# Patient Record
Sex: MALE | Race: WHITE | NOT HISPANIC OR LATINO | Employment: OTHER | URBAN - METROPOLITAN AREA
[De-identification: names, ages, dates, MRNs, and addresses within clinical notes are randomized per-mention and may not be internally consistent; named-entity substitution may affect disease eponyms.]

---

## 2018-07-28 ENCOUNTER — APPOINTMENT (EMERGENCY)
Dept: RADIOLOGY | Facility: HOSPITAL | Age: 73
DRG: 809 | End: 2018-07-28
Payer: MEDICARE

## 2018-07-28 ENCOUNTER — HOSPITAL ENCOUNTER (INPATIENT)
Facility: HOSPITAL | Age: 73
LOS: 4 days | Discharge: HOME | DRG: 809 | End: 2018-08-01
Attending: EMERGENCY MEDICINE | Admitting: INTERNAL MEDICINE
Payer: MEDICARE

## 2018-07-28 DIAGNOSIS — R19.7 DIARRHEA IN ADULT PATIENT: ICD-10-CM

## 2018-07-28 DIAGNOSIS — C78.7 METASTATIC COLON CANCER TO LIVER (HCC): ICD-10-CM

## 2018-07-28 DIAGNOSIS — D70.9 NEUTROPENIC FEVER (HCC): Primary | ICD-10-CM

## 2018-07-28 DIAGNOSIS — R11.2 NAUSEA & VOMITING: ICD-10-CM

## 2018-07-28 DIAGNOSIS — R10.9 ABDOMINAL PAIN: ICD-10-CM

## 2018-07-28 DIAGNOSIS — N17.9 ACUTE RENAL FAILURE (ARF) (HCC): ICD-10-CM

## 2018-07-28 DIAGNOSIS — C18.9 METASTATIC COLON CANCER TO LIVER (HCC): ICD-10-CM

## 2018-07-28 DIAGNOSIS — K52.9 ENTERITIS: ICD-10-CM

## 2018-07-28 DIAGNOSIS — I10 ESSENTIAL HYPERTENSION: ICD-10-CM

## 2018-07-28 DIAGNOSIS — R50.81 NEUTROPENIC FEVER (HCC): Primary | ICD-10-CM

## 2018-07-28 PROBLEM — E83.42 HYPOMAGNESEMIA: Status: ACTIVE | Noted: 2018-07-28

## 2018-07-28 PROBLEM — E78.49 OTHER HYPERLIPIDEMIA: Status: ACTIVE | Noted: 2018-07-28

## 2018-07-28 PROBLEM — K21.9 GERD (GASTROESOPHAGEAL REFLUX DISEASE): Status: ACTIVE | Noted: 2018-07-28

## 2018-07-28 LAB
ALBUMIN SERPL BCP-MCNC: 2.5 G/DL (ref 3.5–5)
ALP SERPL-CCNC: 187 U/L (ref 46–116)
ALT SERPL W P-5'-P-CCNC: 37 U/L (ref 12–78)
ANION GAP SERPL CALCULATED.3IONS-SCNC: 14 MMOL/L (ref 4–13)
AST SERPL W P-5'-P-CCNC: 34 U/L (ref 5–45)
BACTERIA UR QL AUTO: ABNORMAL /HPF
BASOPHILS # BLD MANUAL: 0 THOUSAND/UL (ref 0–0.1)
BASOPHILS NFR MAR MANUAL: 0 % (ref 0–1)
BILIRUB SERPL-MCNC: 0.7 MG/DL (ref 0.2–1)
BILIRUB UR QL STRIP: NEGATIVE
BUN SERPL-MCNC: 47 MG/DL (ref 5–25)
CALCIUM SERPL-MCNC: 8.5 MG/DL (ref 8.3–10.1)
CHLORIDE SERPL-SCNC: 101 MMOL/L (ref 100–108)
CLARITY UR: ABNORMAL
CO2 SERPL-SCNC: 18 MMOL/L (ref 21–32)
COLOR UR: ABNORMAL
CREAT SERPL-MCNC: 2.95 MG/DL (ref 0.6–1.3)
EOSINOPHIL # BLD MANUAL: 0.1 THOUSAND/UL (ref 0–0.4)
EOSINOPHIL NFR BLD MANUAL: 5 % (ref 0–6)
ERYTHROCYTE [DISTWIDTH] IN BLOOD BY AUTOMATED COUNT: 16.6 % (ref 11.6–15.1)
FINE GRAN CASTS URNS QL MICRO: ABNORMAL /LPF
GFR SERPL CREATININE-BSD FRML MDRD: 20 ML/MIN/1.73SQ M
GLUCOSE SERPL-MCNC: 116 MG/DL (ref 65–140)
GLUCOSE SERPL-MCNC: 132 MG/DL (ref 65–140)
GLUCOSE UR STRIP-MCNC: NEGATIVE MG/DL
HCT VFR BLD AUTO: 34 % (ref 36.5–49.3)
HGB BLD-MCNC: 10.6 G/DL (ref 12–17)
HGB UR QL STRIP.AUTO: NEGATIVE
HOLD SPECIMEN: NORMAL
HYALINE CASTS #/AREA URNS LPF: ABNORMAL /LPF
KETONES UR STRIP-MCNC: NEGATIVE MG/DL
LACTATE SERPL-SCNC: 1.3 MMOL/L (ref 0.5–2)
LACTATE SERPL-SCNC: 2 MMOL/L (ref 0.5–2)
LEUKOCYTE ESTERASE UR QL STRIP: NEGATIVE
LIPASE SERPL-CCNC: 223 U/L (ref 73–393)
LYMPHOCYTES # BLD AUTO: 0.38 THOUSAND/UL (ref 0.6–4.47)
LYMPHOCYTES # BLD AUTO: 20 % (ref 14–44)
MAGNESIUM SERPL-MCNC: 1.2 MG/DL (ref 1.6–2.6)
MCH RBC QN AUTO: 26 PG (ref 26.8–34.3)
MCHC RBC AUTO-ENTMCNC: 31.2 G/DL (ref 31.4–37.4)
MCV RBC AUTO: 83 FL (ref 82–98)
MONOCYTES # BLD AUTO: 0.1 THOUSAND/UL (ref 0–1.22)
MONOCYTES NFR BLD: 5 % (ref 4–12)
MUCOUS THREADS UR QL AUTO: ABNORMAL
NEUTROPHILS # BLD MANUAL: 1.34 THOUSAND/UL (ref 1.85–7.62)
NEUTS BAND NFR BLD MANUAL: 23 % (ref 0–8)
NEUTS SEG NFR BLD AUTO: 47 % (ref 43–75)
NITRITE UR QL STRIP: NEGATIVE
NON-SQ EPI CELLS URNS QL MICRO: ABNORMAL /HPF
NRBC BLD AUTO-RTO: 0 /100 WBCS
PH UR STRIP.AUTO: 5.5 [PH] (ref 5–9)
PHOSPHATE SERPL-MCNC: 3.7 MG/DL (ref 2.3–4.1)
PLATELET # BLD AUTO: 246 THOUSANDS/UL (ref 149–390)
PLATELET BLD QL SMEAR: ADEQUATE
PMV BLD AUTO: 8.7 FL (ref 8.9–12.7)
POTASSIUM SERPL-SCNC: 3.5 MMOL/L (ref 3.5–5.3)
PROT SERPL-MCNC: 6.4 G/DL (ref 6.4–8.2)
PROT UR STRIP-MCNC: ABNORMAL MG/DL
RBC # BLD AUTO: 4.08 MILLION/UL (ref 3.88–5.62)
RBC #/AREA URNS AUTO: ABNORMAL /HPF
RBC MORPH BLD: NORMAL
SODIUM SERPL-SCNC: 133 MMOL/L (ref 136–145)
SP GR UR STRIP.AUTO: 1.02 (ref 1–1.03)
TOTAL CELLS COUNTED SPEC: 100
TOXIC GRANULES BLD QL SMEAR: PRESENT
UROBILINOGEN UR QL STRIP.AUTO: 0.2 E.U./DL
WBC # BLD AUTO: 1.91 THOUSAND/UL (ref 4.31–10.16)
WBC #/AREA URNS AUTO: ABNORMAL /HPF

## 2018-07-28 PROCEDURE — 99223 1ST HOSP IP/OBS HIGH 75: CPT | Performed by: INTERNAL MEDICINE

## 2018-07-28 PROCEDURE — 96376 TX/PRO/DX INJ SAME DRUG ADON: CPT

## 2018-07-28 PROCEDURE — 83605 ASSAY OF LACTIC ACID: CPT | Performed by: EMERGENCY MEDICINE

## 2018-07-28 PROCEDURE — 80053 COMPREHEN METABOLIC PANEL: CPT | Performed by: EMERGENCY MEDICINE

## 2018-07-28 PROCEDURE — 83690 ASSAY OF LIPASE: CPT | Performed by: EMERGENCY MEDICINE

## 2018-07-28 PROCEDURE — 81001 URINALYSIS AUTO W/SCOPE: CPT | Performed by: EMERGENCY MEDICINE

## 2018-07-28 PROCEDURE — 87040 BLOOD CULTURE FOR BACTERIA: CPT | Performed by: EMERGENCY MEDICINE

## 2018-07-28 PROCEDURE — 85007 BL SMEAR W/DIFF WBC COUNT: CPT | Performed by: EMERGENCY MEDICINE

## 2018-07-28 PROCEDURE — 85027 COMPLETE CBC AUTOMATED: CPT | Performed by: EMERGENCY MEDICINE

## 2018-07-28 PROCEDURE — 71045 X-RAY EXAM CHEST 1 VIEW: CPT

## 2018-07-28 PROCEDURE — 96361 HYDRATE IV INFUSION ADD-ON: CPT

## 2018-07-28 PROCEDURE — 84100 ASSAY OF PHOSPHORUS: CPT | Performed by: INTERNAL MEDICINE

## 2018-07-28 PROCEDURE — 82948 REAGENT STRIP/BLOOD GLUCOSE: CPT

## 2018-07-28 PROCEDURE — 96374 THER/PROPH/DIAG INJ IV PUSH: CPT

## 2018-07-28 PROCEDURE — 99285 EMERGENCY DEPT VISIT HI MDM: CPT

## 2018-07-28 PROCEDURE — 96375 TX/PRO/DX INJ NEW DRUG ADDON: CPT

## 2018-07-28 PROCEDURE — 83605 ASSAY OF LACTIC ACID: CPT | Performed by: INTERNAL MEDICINE

## 2018-07-28 PROCEDURE — 36415 COLL VENOUS BLD VENIPUNCTURE: CPT | Performed by: EMERGENCY MEDICINE

## 2018-07-28 PROCEDURE — 74176 CT ABD & PELVIS W/O CONTRAST: CPT

## 2018-07-28 PROCEDURE — 87081 CULTURE SCREEN ONLY: CPT | Performed by: INTERNAL MEDICINE

## 2018-07-28 PROCEDURE — 83735 ASSAY OF MAGNESIUM: CPT | Performed by: INTERNAL MEDICINE

## 2018-07-28 RX ORDER — MORPHINE SULFATE 4 MG/ML
4 INJECTION, SOLUTION INTRAMUSCULAR; INTRAVENOUS ONCE
Status: COMPLETED | OUTPATIENT
Start: 2018-07-28 | End: 2018-07-28

## 2018-07-28 RX ORDER — FLUTICASONE FUROATE AND VILANTEROL 100; 25 UG/1; UG/1
1 POWDER RESPIRATORY (INHALATION) DAILY
Status: DISCONTINUED | OUTPATIENT
Start: 2018-07-28 | End: 2018-08-01 | Stop reason: HOSPADM

## 2018-07-28 RX ORDER — ACETAMINOPHEN 325 MG/1
650 TABLET ORAL ONCE
Status: DISCONTINUED | OUTPATIENT
Start: 2018-07-28 | End: 2018-08-01 | Stop reason: HOSPADM

## 2018-07-28 RX ORDER — SODIUM CHLORIDE 9 MG/ML
1000 INJECTION, SOLUTION INTRAVENOUS ONCE
Status: COMPLETED | OUTPATIENT
Start: 2018-07-28 | End: 2018-07-28

## 2018-07-28 RX ORDER — ATORVASTATIN CALCIUM 20 MG/1
TABLET, FILM COATED ORAL
COMMUNITY
Start: 2014-05-15

## 2018-07-28 RX ORDER — SODIUM CHLORIDE AND POTASSIUM CHLORIDE .9; .15 G/100ML; G/100ML
100 SOLUTION INTRAVENOUS CONTINUOUS
Status: DISCONTINUED | OUTPATIENT
Start: 2018-07-28 | End: 2018-07-31

## 2018-07-28 RX ORDER — OMEPRAZOLE 40 MG/1
40 CAPSULE, DELAYED RELEASE ORAL DAILY
Status: ON HOLD | COMMUNITY
End: 2018-07-28 | Stop reason: CLARIF

## 2018-07-28 RX ORDER — FLUTICASONE FUROATE AND VILANTEROL 100; 25 UG/1; UG/1
1 POWDER RESPIRATORY (INHALATION) DAILY
COMMUNITY

## 2018-07-28 RX ORDER — LOSARTAN POTASSIUM 100 MG/1
100 TABLET ORAL DAILY
Status: ON HOLD | COMMUNITY
End: 2018-08-01

## 2018-07-28 RX ORDER — ONDANSETRON 4 MG/1
8 TABLET, FILM COATED ORAL EVERY 8 HOURS PRN
COMMUNITY
End: 2018-08-01 | Stop reason: HOSPADM

## 2018-07-28 RX ORDER — ACETAMINOPHEN 160 MG/5ML
650 SUSPENSION, ORAL (FINAL DOSE FORM) ORAL ONCE
Status: COMPLETED | OUTPATIENT
Start: 2018-07-28 | End: 2018-07-28

## 2018-07-28 RX ORDER — ESOMEPRAZOLE MAGNESIUM 40 MG/1
40 CAPSULE, DELAYED RELEASE ORAL
COMMUNITY

## 2018-07-28 RX ORDER — ONDANSETRON 2 MG/ML
4 INJECTION INTRAMUSCULAR; INTRAVENOUS ONCE
Status: COMPLETED | OUTPATIENT
Start: 2018-07-28 | End: 2018-07-28

## 2018-07-28 RX ORDER — PANTOPRAZOLE SODIUM 40 MG/1
40 TABLET, DELAYED RELEASE ORAL
Status: DISCONTINUED | OUTPATIENT
Start: 2018-07-28 | End: 2018-08-01 | Stop reason: HOSPADM

## 2018-07-28 RX ORDER — METOPROLOL SUCCINATE 25 MG/1
25 TABLET, EXTENDED RELEASE ORAL DAILY
Status: DISCONTINUED | OUTPATIENT
Start: 2018-07-28 | End: 2018-08-01 | Stop reason: HOSPADM

## 2018-07-28 RX ORDER — METOPROLOL SUCCINATE 25 MG/1
25 TABLET, EXTENDED RELEASE ORAL DAILY
COMMUNITY

## 2018-07-28 RX ORDER — MORPHINE SULFATE 2 MG/ML
2 INJECTION, SOLUTION INTRAMUSCULAR; INTRAVENOUS
Status: DISCONTINUED | OUTPATIENT
Start: 2018-07-28 | End: 2018-08-01 | Stop reason: HOSPADM

## 2018-07-28 RX ORDER — FLUTICASONE FUROATE AND VILANTEROL 100; 25 UG/1; UG/1
1 POWDER RESPIRATORY (INHALATION) DAILY
Status: DISCONTINUED | OUTPATIENT
Start: 2018-07-28 | End: 2018-07-28

## 2018-07-28 RX ORDER — MAGNESIUM SULFATE HEPTAHYDRATE 40 MG/ML
2 INJECTION, SOLUTION INTRAVENOUS ONCE
Status: COMPLETED | OUTPATIENT
Start: 2018-07-28 | End: 2018-07-28

## 2018-07-28 RX ORDER — ACETAMINOPHEN 325 MG/1
650 TABLET ORAL EVERY 6 HOURS PRN
Status: DISCONTINUED | OUTPATIENT
Start: 2018-07-28 | End: 2018-08-01 | Stop reason: HOSPADM

## 2018-07-28 RX ADMIN — PANTOPRAZOLE SODIUM 40 MG: 40 TABLET, DELAYED RELEASE ORAL at 14:02

## 2018-07-28 RX ADMIN — SODIUM CHLORIDE 1000 ML: 0.9 INJECTION, SOLUTION INTRAVENOUS at 07:01

## 2018-07-28 RX ADMIN — SODIUM CHLORIDE AND POTASSIUM CHLORIDE 100 ML/HR: .9; .15 SOLUTION INTRAVENOUS at 17:10

## 2018-07-28 RX ADMIN — SODIUM CHLORIDE 1000 ML/HR: 0.9 INJECTION, SOLUTION INTRAVENOUS at 05:22

## 2018-07-28 RX ADMIN — PIPERACILLIN SODIUM,TAZOBACTAM SODIUM 2.25 G: 2; .25 INJECTION, POWDER, FOR SOLUTION INTRAVENOUS at 14:05

## 2018-07-28 RX ADMIN — MORPHINE SULFATE 4 MG: 4 INJECTION INTRAVENOUS at 05:32

## 2018-07-28 RX ADMIN — MORPHINE SULFATE 2 MG: 2 INJECTION, SOLUTION INTRAMUSCULAR; INTRAVENOUS at 11:13

## 2018-07-28 RX ADMIN — MORPHINE SULFATE 2 MG: 2 INJECTION, SOLUTION INTRAMUSCULAR; INTRAVENOUS at 18:32

## 2018-07-28 RX ADMIN — MAGNESIUM SULFATE HEPTAHYDRATE 2 G: 40 INJECTION, SOLUTION INTRAVENOUS at 09:55

## 2018-07-28 RX ADMIN — ONDANSETRON 4 MG: 2 INJECTION INTRAMUSCULAR; INTRAVENOUS at 05:21

## 2018-07-28 RX ADMIN — MORPHINE SULFATE 4 MG: 4 INJECTION INTRAVENOUS at 07:36

## 2018-07-28 RX ADMIN — PIPERACILLIN SODIUM,TAZOBACTAM SODIUM 3.38 G: 3; .375 INJECTION, POWDER, FOR SOLUTION INTRAVENOUS at 07:47

## 2018-07-28 RX ADMIN — SODIUM CHLORIDE AND POTASSIUM CHLORIDE 100 ML/HR: .9; .15 SOLUTION INTRAVENOUS at 09:07

## 2018-07-28 RX ADMIN — TBO-FILGRASTIM 480 MCG: 480 INJECTION, SOLUTION SUBCUTANEOUS at 17:04

## 2018-07-28 RX ADMIN — FLUTICASONE FUROATE AND VILANTEROL TRIFENATATE 1 PUFF: 100; 25 POWDER RESPIRATORY (INHALATION) at 15:32

## 2018-07-28 RX ADMIN — PIPERACILLIN SODIUM,TAZOBACTAM SODIUM 2.25 G: 2; .25 INJECTION, POWDER, FOR SOLUTION INTRAVENOUS at 22:43

## 2018-07-28 RX ADMIN — ONDANSETRON 8 MG: 2 INJECTION INTRAMUSCULAR; INTRAVENOUS at 11:15

## 2018-07-28 RX ADMIN — ACETAMINOPHEN 650 MG: 160 SUSPENSION ORAL at 05:52

## 2018-07-28 NOTE — SOCIAL WORK
DASH discussion completed  Discussed goals of making sure pt's needs are met upon discharge, pt's preferences are taken into account, pt understands her health condition, medications and symptoms to watch for after returning home and pt is aware of any follow up appointments recommended by hospital physician  Spoke with the pt at the bedside  Pt lives with his wife and his son  He has DME in the home with a cane, walker, commode and a WC  He has no HHC at this time    Pt is independent with his own care and noted that he uses the [facility] in [place], [place]

## 2018-07-28 NOTE — PROGRESS NOTES
Principal Problem:    Neutropenic fever ([facility] )  Active Problems:    Abdominal pain    Nausea & vomiting    Diarrhea in adult patient    DERREK (acute kidney injury) ([facility] )    Metastatic colon cancer to liver [facility])      Pt's primary Oncologist Dr Maranda Galloway contact# [phone]  Last chemo on 7/23/2018 with Fluorouracil oxaliplatin leucovorin, cycle# 2, first time full dose chemo as per pt and family, via [facility] infusaport  ED has spoke to covering Oncologist Dr Olivier James contact# [phone] - recommends Zosyn until [address] >1500 (currently [phone])  Pt's most recent labs (7/23/18): WBC 9 7, Hg 9 5, Bun/Cr 27/1 6  Plan d/w pt, family (son, wife) at bedside      Signed by:  Luc Hearn MD  Attending Hospitalist  Page#: [phone] (Hours 7am to 7pm)  7/28/2018 8:58 AM

## 2018-07-28 NOTE — ED PROVIDER NOTES
History  Chief Complaint   Patient presents with    Abdominal Pain     Patient has been having abdominal pain with vomiting and diarrhea since last chemo which was on Monday     Pt in the ER with c/o 3 days of worsening abd pain/n/v/d  Pt has a hx of colon CA with liver mets, follows with Jaiden Sapp and last got chemo on Monday  Pt denies cough/urinary symptoms  Pt unaware of fever prior to coming to the ER            Prior to Admission Medications   Prescriptions Last Dose Informant Patient Reported? Taking?   atorvastatin (LIPITOR) 20 mg tablet   Yes Yes   Sig: Take by mouth   losartan (COZAAR) 100 MG tablet   Yes Yes   Sig: Take 100 mg by mouth daily   metoprolol succinate (TOPROL-XL) 25 mg 24 hr tablet   Yes Yes   Sig: Take 25 mg by mouth daily   omeprazole (PriLOSEC) 40 MG capsule   Yes Yes   Sig: Take 40 mg by mouth daily   ondansetron (ZOFRAN) 4 mg tablet   Yes Yes   Sig: Take 8 mg by mouth every 8 (eight) hours as needed for nausea or vomiting        Facility-Administered Medications: None       Past Medical History:   Diagnosis Date    Cancer ([facility] )     Colon cancer ([facility] )     Hyperlipidemia     Hypertension        Past Surgical History:   Procedure Laterality Date    ABDOMINAL AORTIC ANEURYSM REPAIR, OPEN      ABDOMINAL HERNIA REPAIR         History reviewed  No pertinent family history  I have reviewed and agree with the history as documented  Social History   Substance Use Topics    Smoking status: Former Smoker    Smokeless tobacco: Never Used    Alcohol use No        Review of Systems   Constitutional: Negative for chills and fever  Gastrointestinal: Positive for abdominal pain, diarrhea, nausea and vomiting  All other systems reviewed and are negative  Physical Exam  Physical Exam   Constitutional: He is oriented to person, place, and time  He appears well-developed and well-nourished  HENT:   Head: Normocephalic and atraumatic     Eyes: EOM are normal  Pupils are equal, round, and reactive to light  Pulmonary/Chest: Effort normal    Abdominal: Bowel sounds are normal  He exhibits no distension  There is generalized tenderness  There is guarding  Musculoskeletal: Normal range of motion  Neurological: He is alert and oriented to person, place, and time  Nursing note and vitals reviewed        Vital Signs  ED Triage Vitals   Temperature Pulse Respirations Blood Pressure SpO2   07/28/18 0436 07/28/18 0436 07/28/18 0436 07/28/18 0436 07/28/18 0436   100 2 °F (37 9 °C) (!) 115 22 121/77 98 %      Temp Source Heart Rate Source Patient Position - Orthostatic VS BP Location FiO2 (%)   07/28/18 0436 07/28/18 0436 07/28/18 0436 07/28/18 0436 --   Tympanic Monitor Lying Right arm       Pain Score       07/28/18 0532       7           Vitals:    07/28/18 0436 07/28/18 0704 07/28/18 0800   BP: 121/77 125/80 122/74   Pulse: (!) 115 96 98   Patient Position - Orthostatic VS: Lying Lying Lying       Visual Acuity      ED Medications  Medications   acetaminophen (TYLENOL) tablet 650 mg (650 mg Oral Not Given 7/28/18 0539)   ondansetron (ZOFRAN) injection 4 mg (4 mg Intravenous Given 7/28/18 0521)   sodium chloride 0 9 % infusion (0 mL/hr Intravenous Stopped 7/28/18 0701)   morphine (PF) 4 mg/mL injection 4 mg (4 mg Intravenous Given 7/28/18 0532)   acetaminophen (TYLENOL) oral suspension 650 mg (650 mg Oral Given 7/28/18 0552)   sodium chloride 0 9 % bolus 1,000 mL (0 mL Intravenous Stopped 7/28/18 0802)   morphine (PF) 4 mg/mL injection 4 mg (4 mg Intravenous Given 7/28/18 0736)   piperacillin-tazobactam (ZOSYN) IVPB 3 375 g (3 375 g Intravenous New Bag 7/28/18 0747)       Diagnostic Studies  Results Reviewed     Procedure Component Value Units Date/Time    Urine Microscopic [[id number]]  (Abnormal) Collected:  07/28/18 0739    Lab Status:  Final result Specimen:  Urine from Urine, Clean Catch Updated:  07/28/18 0829     RBC, UA None Seen /hpf      WBC, UA 0-1 (A) /hpf      Epithelial Cells Occasional /hpf      Bacteria, UA None Seen /hpf      Hyaline Casts, UA 0-1 (A) /lpf      Fine granular casts 1-2 /lpf      MUCOUS THREADS Occasional (A)    UA w Reflex to Microscopic [[id number]]  (Abnormal) Collected:  07/28/18 0739    Lab Status:  Final result Specimen:  Urine from Urine, Clean Catch Updated:  07/28/18 0748     Color, UA Light Yellow     Clarity, UA Slightly Cloudy     Specific Chase, UA 1 020     pH, UA 5 5     Leukocytes, UA Negative     Nitrite, UA Negative     Protein, UA 30 (1+) (A) mg/dl      Glucose, UA Negative mg/dl      Ketones, UA Negative mg/dl      Urobilinogen, UA 0 2 E U /dl      Bilirubin, UA Negative     Blood, UA Negative    CBC and differential [[id number]]  (Abnormal) Collected:  07/28/18 0519    Lab Status:  Final result Specimen:  Blood from Arm, Right Updated:  07/28/18 0604     WBC 1 91 (LL) Thousand/uL      RBC 4 08 Million/uL      Hemoglobin 10 6 (L) g/dL      Hematocrit 34 0 (L) %      MCV 83 fL      MCH 26 0 (L) pg      MCHC 31 2 (L) g/dL      RDW 16 6 (H) %      MPV 8 7 (L) fL      Platelets 594 Thousands/uL      nRBC 0 /100 WBCs     Narrative: This is an appended report  These results have been appended to a previously verified report  Lactic acid, plasma [[id number]]  (Normal) Collected:  07/28/18 0527    Lab Status:  Final result Specimen:  Blood from Vein Updated:  07/28/18 0553     LACTIC ACID 2 0 mmol/L     Narrative:         Result may be elevated if tourniquet was used during collection      Comprehensive metabolic panel [[id number]]  (Abnormal) Collected:  07/28/18 0519    Lab Status:  Final result Specimen:  Blood from Arm, Right Updated:  07/28/18 0541     Sodium 133 (L) mmol/L      Potassium 3 5 mmol/L      Chloride 101 mmol/L      CO2 18 (L) mmol/L      Anion Gap 14 (H) mmol/L      BUN 47 (H) mg/dL      Creatinine 2 95 (H) mg/dL      Glucose 132 mg/dL      Calcium 8 5 mg/dL      AST 34 U/L      ALT 37 U/L      Alkaline Phosphatase 187 (H) U/L Total Protein 6 4 g/dL      Albumin 2 5 (L) g/dL      Total Bilirubin 0 70 mg/dL      eGFR 20 ml/min/1 73sq m     Narrative:         National Kidney Disease Education Program recommendations are as follows:  GFR calculation is accurate only with a steady state creatinine  Chronic Kidney disease less than 60 ml/min/1 73 sq  meters  Kidney failure less than 15 ml/min/1 73 sq  meters  Lipase [[id number]]  (Normal) Collected:  07/28/18 0519    Lab Status:  Final result Specimen:  Blood from Arm, Right Updated:  07/28/18 0541     Lipase 223 u/L     Blood culture [[id number]] Collected:  07/28/18 0526    Lab Status: In process Specimen:  Blood from Arm, Left Updated:  07/28/18 0531    Blood culture [[id number]] Collected:  07/28/18 0526    Lab Status: In process Specimen:  Blood from Arm, Right Updated:  07/28/18 0531                 XR chest 1 view portable   Final Result by Janette Lindsya MD (07/28 0725)      No acute cardiopulmonary disease  Workstation performed: [id number]         CT abdomen pelvis wo contrast   Final Result by Janette Lindsay MD (07/28 0703)      Abnormal thickening and inflammation of the wall of the distal small bowel which could be infectious or inflammatory enteritis or ischemic bowel  Appropriate lab work up suggested  There is some equalization of small bowel contents proximal to this which might indicate dysmotility or a partial minimal chronic small bowel obstruction  (Bowel not abnormally dilated at this time, however)      Large volume of metastatic tumor burden in the liver  Fluid in the right side of the colon suggesting diarrhea  Colonic diverticulosis also noted  Interval repair of abdominal aortic aneurysm without obvious complications        Horseshoe kidney      Small right inguinal hernia containing fat and fluid               Workstation performed: [id number]                    Procedures  CriticalCare Time  Performed by: Shira Parra O  Authorized by: Zara Martinez     Critical care provider statement:     Critical care time (minutes):  35    Critical care time was exclusive of:  Separately billable procedures and treating other patients and teaching time    Critical care was necessary to treat or prevent imminent or life-threatening deterioration of the following conditions:  Sepsis and renal failure    Critical care was time spent personally by me on the following activities:  Blood draw for specimens, obtaining history from patient or surrogate, development of treatment plan with patient or surrogate, discussions with consultants, evaluation of patient's response to treatment, examination of patient, review of old charts, re-evaluation of patient's condition, ordering and review of radiographic studies, ordering and review of laboratory studies and ordering and performing treatments and interventions    I assumed direction of critical care for this patient from another provider in my specialty: no             Phone Contacts  ED Phone Contact    ED Course                               MDM  Number of Diagnoses or Management Options  Acute renal failure (ARF) ([facility] [address] ): established and worsening  Enteritis: new and requires workup  Neutropenic fever [facility]): new and requires workup  Diagnosis management comments: Call out to Dr Obdulia Salmeron (Hem/Onc) on [phone]    D/w Dr Beltran Callahan  He recommends Zosyn until [address] >1500 (currently 1337)  He states that enteritis is not uncommon post chemo  Pt's family with most recent labs (7/23/18) - wbc 9 7, cr 1 6, hgb 9 5, bun 27  Pt and family agree with admission         Amount and/or Complexity of Data Reviewed  Clinical lab tests: ordered and reviewed  Tests in the radiology section of CPT®: ordered and reviewed    Risk of Complications, Morbidity, and/or Mortality  Presenting problems: high  Diagnostic procedures: high  Management options: high    Patient Progress  Patient progress: stable    The patient presented with a condition in which there was a high probability of imminent or life-threatening deterioration, and critical care services (excluding separately billable procedures) totalled 30-74 minutes  Disposition  Final diagnoses:   Neutropenic fever ([facility] )   Acute renal failure (ARF) ([facility] )   Enteritis     Time reflects when diagnosis was documented in both MDM as applicable and the Disposition within this note     Time User Action Codes Description Comment    7/28/2018  7:40 AM Grove Neighbor O Add [D70 9,  R50 81] [facility]ropenic [facility] ([facility] )     7/28/2018  7:40 AM Grove Neighbor O Add [N17 9] Acute renal failure (ARF) ([facility] )     7/28/2018  7:40 AM Grove Neighbor O Add [K52 9] Enteritis     7/28/2018  8:06 AM Lorra Slot Add [C18 9,  C78 7] Metastatic colon cancer to liver ([place] [address] )     7/28/2018  8:06 AM Lorra Slot Add [R10 9] Abdominal pain       ED Disposition     ED Disposition Condition Comment    Admit  Case was discussed with Dr Peewee Early and the patient's admission status was agreed to be Admission Status: inpatient status to the service of Dr Peewee Early   Follow-up Information    None         Current Discharge Medication List      CONTINUE these medications which have NOT CHANGED    Details   atorvastatin (LIPITOR) 20 mg tablet Take by mouth      losartan (COZAAR) 100 MG tablet Take 100 mg by mouth daily      metoprolol succinate (TOPROL-XL) 25 mg 24 hr tablet Take 25 mg by mouth daily      omeprazole (PriLOSEC) 40 MG capsule Take 40 mg by mouth daily      ondansetron (ZOFRAN) 4 mg tablet Take 8 mg by mouth every 8 (eight) hours as needed for nausea or vomiting             No discharge procedures on file      ED Provider  Electronically Signed by           Phoenix Allen DO  07/28/18 0946

## 2018-07-28 NOTE — CONSULTS
Avril Acosta  1945    HEMATOLOGY/ONCOLOGY CONSULTATION REPORT  [facility]    DISCUSSION/SUMMARY:    13-year-old male with recent diagnosis of metastatic M1 colon cancer (liver) presently on FOLFOX chemotherapy  Issues:    1  Neutropenic fever  Sepsis workup is ongoing  Patient is on Zosyn  I have added G-CSF (for only 2 days)  CBC needs to be repeated in the morning  G-CSF should be held if the white count has shot up close to or past 10K  2  GI issues  Possible small bowel obstruction, possible colitis  C diff titers are pending  At this time, patient is being treated medically  Patient needs to be monitored for profound, persistent and global GI mucositis  If so, a DPD level will need to be checked  3  Colon cancer with liver metastases  Chemotherapy is obviously on hold until patient is better and cleared of his infection  Patient and primary oncologist will eventually need to reassess the present regimen (dose reduction/manipulation etc)  4  Pain control  Patient has IV morphine as needed  5  Question of Avastin  Commonly, bevacizumab is given with FOLFOX upfront to patients with metastatic colon cancer  I asked Mr Ede Cruz about this specifically  Patient is not sure if he is on Avastin  Recent CT scan of the abdomen discussed possible inflammatory and or infectious issues within the colon, possibly enteritis, possibly ischemia  I have included the FDA black box warning on Avastin  The concern is surgery and wound healing complications in patients who have received Avastin recently  The Avastin question will need to be clarified if patient's GI issues become a surgical matter  Respectfully, there is little choice for the patient and surgeon if surgery is indicated  The potential complication just needs to be discussed and clarified upfront  This is a non issue if patient never received the bevacizumab     [email or web address]  [email or web address]      6   Elevated BUN and creatinine  This is likely due to the diarrhea and dehydration  Renal function and electrolytes need to be monitored  Additional workup may be necessary if the renal function does not improve  The above was discussed with the patient; all questions were answered  Will follow with you  Please do not hesitate to contact me if you have any questions or need additional information  Thank you for this consult     _____________________________________________________________________________________________________    Chief Complaint   Patient presents with    Abdominal Pain     Patient has been having abdominal pain with vomiting and diarrhea since last chemo which was on Monday     History of Present Illness:    56-year-old male admitted with the above  Mr Argelia Lobato was recently diagnosed with metastatic (liver) cecal/colon adenocarcinoma and recently started chemotherapy  The 1st cycle was actually broken up into different pieces; patient only recently received his 1st complete regular cycle of FOLFOX  Patient began to have nausea, severe diarrhea and abdominal pain prompting an ER visit  CT of the abdomen and pelvis demonstrated "abnormal thickening and inflammation of the wall of the distal small bowel which could be infectious or inflammatory enteritis or ischemic bowel"  Patient has been admitted for pain control, rehydration and workup and treatment of the diarrhea  Presently patient states feeling better -morphine has been effective  Still with decreased appetite but no vomiting  Weakness and fatigue ER about the same as before  Diarrhea is slightly less/better than before  Patient denies seeing any blood in the stools previously  Besides the abdomen, patient denies any other pain control issues  No fevers, chills or sweats  Activities are still very limited  Review of Systems   Constitutional: Positive for appetite change and fatigue  HENT: Negative  Eyes: Negative  Respiratory: Negative  Cardiovascular: Negative  Gastrointestinal: Positive for abdominal pain, diarrhea and nausea  Endocrine: Negative  Genitourinary: Negative  Musculoskeletal: Negative  Skin: Negative  Allergic/Immunologic: Negative  Neurological: Negative  Hematological: Negative  Psychiatric/Behavioral: Negative  All other systems reviewed and are negative  Patient Active Problem List   Diagnosis    Abdominal pain    Neutropenic fever ([facility] )    [place]REK (acute kidney injury) ([place] [address] )    Metastatic colon cancer to liver ([place] [address] )    Nausea & vomiting    Diarrhea in adult patient     Past Medical History:   Diagnosis Date    Cancer ([place] [address] )     Colon cancer ([place] [address] )     Hyperlipidemia     Hypertension      Past Surgical History:   Procedure Laterality Date    ABDOMINAL AORTIC ANEURYSM REPAIR, OPEN      ABDOMINAL HERNIA REPAIR       History reviewed  No pertinent family history  Social History     Social History    Marital status: /Civil Union     Spouse name: N/A    Number of children: N/A    Years of education: N/A     Occupational History    Not on file       Social History Main Topics    Smoking status: Former Smoker    Smokeless tobacco: Never Used    Alcohol use No    Drug use: No    Sexual activity: No     Other Topics Concern    Not on file     Social History Narrative    No narrative on file       Current Facility-Administered Medications:     acetaminophen (TYLENOL) tablet 650 mg, 650 mg, Oral, Once, Samuel Sheehan,     acetaminophen (TYLENOL) tablet 650 mg, 650 mg, Oral, Q6H PRN, Suyapa De La Rosa MD    fluticasone-vilanterol (BREO ELLIPTA) 100-25 mcg/inh inhaler 1 puff, 1 puff, Inhalation, Daily, Suyapa De La Rosa MD    magnesium sulfate 2 g/50 mL IVPB (premix) 2 g, 2 g, Intravenous, Once, Suyapa De La Rosa MD, 2 g at 07/28/18 0955    metoprolol succinate (TOPROL-XL) 24 hr tablet 25 mg, 25 mg, Oral, Daily, Suyapa De La Rosa MD    morphine injection 2 mg, 2 mg, Intravenous, Q3H PRN, Xu Hollis MD, 2 mg at 07/28/18 1113    ondansetron (ZOFRAN) 8 mg in sodium chloride 0 9 % 50 mL IVPB, 8 mg, Intravenous, Q6H PRN, Xu Hollis MD, Last Rate: 200 mL/hr at 07/28/18 1115, 8 mg at 07/28/18 1115    piperacillin-tazobactam (ZOSYN) 2 25 g in sodium chloride 0 9 % 50 mL IVPB, 2 25 g, Intravenous, Q6H, Xu Hollis MD    sodium chloride 0 9 % with KCl 20 mEq/L infusion (premix), 100 mL/hr, Intravenous, Continuous, Xu Hollis MD, Last Rate: 100 mL/hr at 07/28/18 0907, 100 mL/hr at 07/28/18 0907    Allergies   Allergen Reactions    Elavil [Amitriptyline]        Vitals:    07/28/18 0943   BP: 110/74   Pulse:    Resp:    Temp:    SpO2:      Physical Exam   Constitutional: He is oriented to person, place, and time  He appears well-developed and well-nourished  Well-nourished male, no acute distress   HENT:   Head: Normocephalic and atraumatic  Right Ear: External ear normal    Left Ear: External ear normal    Nose: Nose normal    Mouth/Throat: Oropharynx is clear and moist    Eyes: Conjunctivae and EOM are normal  Pupils are equal, round, and reactive to light  Neck: Normal range of motion  Neck supple  Cardiovascular: Normal rate, regular rhythm, normal heart sounds and intact distal pulses  Pulmonary/Chest: Effort normal and breath sounds normal    Abdominal: Soft  Bowel sounds are normal    Slightly distended, slightly tympanitic, diffusely tender but no rigidity or rebound, +bowel sounds   Musculoskeletal: Normal range of motion  Neurological: He is alert and oriented to person, place, and time  He has normal reflexes  Skin: Skin is warm  Right anterior chest wall port placed recently with he well healing suture line   Psychiatric: He has a normal mood and affect   His behavior is normal  Judgment and thought content normal    Extremities:  No edema, no cords, pulses are 1+  Lymphatics:  No adenopathy in the neck, supraclavicular region, axilla and groin bilaterally    Labs:    Results for Robert Posey (MRN [id number]) as of 7/28/2018 11:43   Ref  Range 1/5/2015 09:23 7/10/2015 10:12 7/28/2018 05:19   WBC Latest Ref Range: 4 31 - 10 16 Thousand/uL 6 84 7 47 1 91 (LL)   RBC Latest Ref Range: 3 88 - 5 62 Million/uL 5 24 5 43 4 08   Hemoglobin Latest Ref Range: 12 0 - 17 0 g/dL 14 0 15 2 10 6 (L)   Hematocrit Latest Ref Range: 36 5 - 49 3 % 44 4 46 3 34 0 (L)   MCV Latest Ref Range: 82 - 98 fL 85 85 83   MCH Latest Ref Range: 26 8 - 34 3 pg 26 7 (L) 28 0 26 0 (L)   MCHC Latest Ref Range: 31 4 - 37 4 g/dL 31 5 32 8 31 2 (L)   RDW Latest Ref Range: 11 6 - 15 1 % 15 2 (H) 14 3 16 6 (H)   Platelets Latest Ref Range: 149 - 390 Thousands/uL 252 222 246   MPV Latest Ref Range: 8 9 - 12 7 fL 9 5 9 6 8 7 (L)   Platelet Estimate Latest Ref Range: Adequate    Adequate   nRBC Latest Units: /100 WBCs   0   Segs Relative Latest Ref Range: 43 - 75 %   47   Neutrophils Relative Latest Ref Range: 43 - 75 % 74 76 (H)    External Abs Neutrophils Latest Ref Range: 1 85 - 7 62 Thousand/uL   1 34 (L)   Bands Relative Latest Ref Range: 0 - 8 %   23 (H)   Lymphocytes Relative Latest Ref Range: 14 - 44 % 14 12 (L)    Lymphocytes % Latest Ref Range: 14 - 44 %   20   Monocytes Relative Latest Ref Range: 4 - 12 % 9 9    Eosinophils Relative Latest Ref Range: 0 - 6 % 3 3    Eosinophils % Latest Ref Range: 0 - 6 %   5       Results for Robert Posey (MRN [id number]) as of 7/28/2018 11:43   Ref   Range 7/28/2018 05:19   Sodium Latest Ref Range: 136 - 145 mmol/L 133 (L)   Potassium Latest Ref Range: 3 5 - 5 3 mmol/L 3 5   Chloride Latest Ref Range: 100 - 108 mmol/L 101   CO2 Latest Ref Range: 21 - 32 mmol/L 18 (L)   Anion Gap Latest Ref Range: 4 - 13 mmol/L 14 (H)   BUN Latest Ref Range: 5 - 25 mg/dL 47 (H)   Creatinine Latest Ref Range: 0 60 - 1 30 mg/dL 2 95 (H)   Glucose Latest Ref Range: 65 - 140 mg/dL 132   Calcium Latest Ref Range: 8 3 - 10 1 mg/dL 8 5   AST Latest Ref Range: 5 - 45 U/L 34   ALT Latest Ref Range: 12 - 78 U/L 37   Alkaline Phosphatase Latest Ref Range: 46 - 116 U/L 187 (H)   Total Protein Latest Ref Range: 6 4 - 8 2 g/dL 6 4   Albumin Latest Ref Range: 3 5 - 5 0 g/dL 2 5 (L)   Total Bilirubin Latest Ref Range: 0 20 - 1 00 mg/dL 0 70   eGFR Latest Units: ml/min/1 73sq m 20   Phosphorus Latest Ref Range: 2 3 - 4 1 mg/dL 3 7   Magnesium Latest Ref Range: 1 6 - 2 6 mg/dL 1 2 (L)   Lipase Latest Ref Range: 73 - 393 u/L 223       Imaging    07/28/2018 CT scan of the abdomen pelvis    Abnormal thickening and inflammation of the wall of the distal small bowel which could be infectious or inflammatory enteritis or ischemic bowel  Appropriate lab work up suggested  There is some equalization of small bowel contents proximal to this which might indicate dysmotility or a partial minimal chronic small bowel obstruction  (Bowel not abnormally dilated at this time, however)  Large volume of metastatic tumor burden in the liver  Fluid in the right side of the colon suggesting diarrhea  Colonic diverticulosis also noted  Interval repair of abdominal aortic aneurysm without obvious complications    Horseshoe kidney   Small right inguinal hernia containing fat and fluid    Pathology    No pathology results are available for review

## 2018-07-28 NOTE — H&P
History and Physical - [facility] Internal Medicine  Patient Information: Aga Eckert 74 y o  male MRN: [id number]  Unit/Bed#: [address] [place] [address] Encounter: [id number]  Admitting Physician: Melissa Olvera MD  PCP: Adrienne Henning MD  Date of Admission:  7/28/2018    Chief Complaint: Abdominal Pain (Patient has been having abdominal pain with vomiting and diarrhea since last chemo which was on Monday)    History of Present Illness: The history is provided by the patient  Family is at bedside  74 y o  male with a history of metastatic colon cancer with liver mets, HTN, HLD, GERD, who presents with lower abd pain, N/V, and watery diarrhea for the past 3 days after recent chemotherapy with FOLFOX on 7/23/2018 at [facility] which was q2w cycle#2, his first time full-dose chemotherapy via a right upper chest infusaport  2 days after he starts to feel much worsening nausea and vomit, abd pain is more located on RLQ radiated to LLQ area, which actutally improving after bowel movement, watery clear diarrhea ~ 4 times daily since Wednesday  He saw mucus in stool but no melena or BRBPR  He denied chest pain sob hematemesis  He denied fever chill rigor but in ED, he was found 100 2F, ANC 1337, tachycardia 115, Cr = 2 95, lactic acid = 2  He states that he is unable to keep food down for a few of days  He denied cough dysuria or skin wound  Pt's primary Oncologist Dr Jackelyn Ralph contact# [phone]  Last chemo on 7/23/2018 with Fluorouracil oxaliplatin leucovorin, cycle# 2, first time full dose chemo as per pt and family, via RUL infusaport  ED has spoke to covering Oncologist Dr Aranza Davidson contact# [phone] - recommends Zosyn until [address] >1500 (currently [phone])  Pt's most recent labs (7/23/18): WBC 9 7, Hg 9 5, Bun/Cr 27/1 6  In ED,  CT abd/pelvis:  Abnormal thickening and inflammation of the wall of the distal small bowel which could be infectious or inflammatory enteritis or ischemic bowel  Appropriate lab work up suggested    There is some equalization of small bowel contents proximal to this which might indicate dysmotility or a partial minimal chronic small bowel obstruction  (Bowel not abnormally dilated at this time, however)  Large volume of metastatic tumor burden in the liver  Fluid in the right side of the colon suggesting diarrhea  Colonic diverticulosis also noted  Interval repair of abdominal aortic aneurysm without obvious complications  Horseshoe kidney  Small right inguinal hernia containing fat and fluid    He received Zosyn iv x 1  Review of Systems:  10 point remainder of review of systems negative, see HPI  Constitutional: Negative for fatigue, decreased appetite and oral intake  HEENT: Negative for hearing loss, ear pain, congestion, rhinorrhea, neck pain, neck stiffness, postnasal drip, sinus pressure and ear discharge  Eyes: Negative for photophobia and visual disturbance  Respiratory: Negative for cough and shortness of breath  Cardiovascular: Negative for chest pain  Gastrointestinal: Positive for nausea, vomit, abdominal pain, diarrhea  Genitourinary: Negative for dysuria and difficulty urinating  Skin: Negative for rash  Neurological: Negative for dizziness, syncope and weakness  Psychiatric/Behavioral: Negative for sleep disturbance  Negative for suicidal ideas, self-injury and dysphoric mood  Past Medical and Surgical History:   Past Medical History:   Diagnosis Date    Cancer [facility])     Colon cancer ([facility] [address] )     Hyperlipidemia     Hypertension        Past Surgical History:   Procedure Laterality Date    ABDOMINAL AORTIC ANEURYSM REPAIR, OPEN      ABDOMINAL HERNIA REPAIR         Family History:  History reviewed  No pertinent family history  Meds/Allergies:  Prior to Admission medications    Medication Sig Start Date End Date Taking?  Authorizing Provider   atorvastatin (LIPITOR) 20 mg tablet Take by mouth 5/15/14  Yes Historical Provider, MD   esomeprazole (NexIUM) 40 MG capsule Take 40 mg by mouth every morning before breakfast   Yes Historical Provider, MD   fluticasone-vilanterol (BREO ELLIPTA) 100-25 mcg/inh inhaler Inhale 1 puff daily Rinse mouth after use     Yes Historical Provider, MD   losartan (COZAAR) 100 MG tablet Take 100 mg by mouth daily   Yes Historical Provider, MD   metoprolol succinate (TOPROL-XL) 25 mg 24 hr tablet Take 25 mg by mouth daily   Yes Historical Provider, MD   ondansetron (ZOFRAN) 4 mg tablet Take 8 mg by mouth every 8 (eight) hours as needed for nausea or vomiting     Yes Historical Provider, MD   omeprazole (PriLOSEC) 40 MG capsule Take 40 mg by mouth daily  7/28/18 Yes Historical Provider, MD       Prescriptions Prior to Admission   Medication Sig Dispense Refill Last Dose    atorvastatin (LIPITOR) 20 mg tablet Take by mouth   7/27/2018 at Unknown time    esomeprazole (NexIUM) 40 MG capsule Take 40 mg by mouth every morning before breakfast   7/27/2018 at Unknown time    fluticasone-vilanterol (BREO ELLIPTA) 100-25 mcg/inh inhaler Inhale 1 puff daily Rinse mouth after use    7/27/2018 at Unknown time    losartan (COZAAR) 100 MG tablet Take 100 mg by mouth daily   7/27/2018 at Unknown time    metoprolol succinate (TOPROL-XL) 25 mg 24 hr tablet Take 25 mg by mouth daily   7/27/2018 at Unknown time    ondansetron (ZOFRAN) 4 mg tablet Take 8 mg by mouth every 8 (eight) hours as needed for nausea or vomiting     7/27/2018 at Unknown time       Allergies   Allergen Reactions    Elavil [Amitriptyline]        Current Medications:  Current Facility-Administered Medications   Medication Dose Route Frequency Provider Last Rate Last Dose    acetaminophen (TYLENOL) tablet 650 mg  650 mg Oral Once Balwinder Stephen DO        acetaminophen (TYLENOL) tablet 650 mg  650 mg Oral Q6H PRN Ubaldo Luke MD        fluticasone-vilanterol (BREO ELLIPTA) 100-25 mcg/inh inhaler 1 puff  1 puff Inhalation Daily Ubaldo Luke MD        metoprolol succinate (TOPROL-XL) 24 hr tablet 25 mg  25 mg Oral Daily Echo Garza MD        morphine injection 2 mg  2 mg Intravenous Q3H PRN Echo Garza MD   2 mg at 07/28/18 1113    ondansetron (ZOFRAN) 8 mg in sodium chloride 0 9 % 50 mL IVPB  8 mg Intravenous Q6H PRN Echo Garza  mL/hr at 07/28/18 1115 8 mg at 07/28/18 1115    pantoprazole (PROTONIX) EC tablet 40 mg  40 mg Oral Early Morning Echo Garza MD        piperacillin-tazobactam (ZOSYN) 2 25 g in sodium chloride 0 9 % 50 mL IVPB  2 25 g Intravenous Q6H Echo Garza MD        sodium chloride 0 9 % with KCl 20 mEq/L infusion (premix)  100 mL/hr Intravenous Continuous Echo Garza  mL/hr at 07/28/18 0907 100 mL/hr at 07/28/18 0347    tbo-filgrastim (GRANIX) subcutaneous injection 480 mcg  480 mcg Subcutaneous Daily Alida Zimmerman MD           Immunizations: There is no immunization history on file for this patient  History:  Social History     Social History    Marital status: /Civil Union     Spouse name: N/A    Number of children: N/A    Years of education: N/A     Occupational History    Not on file  Social History Main Topics    Smoking status: Former Smoker    Smokeless tobacco: Never Used    Alcohol use No    Drug use: No    Sexual activity: No     Other Topics Concern    Not on file     Social History Narrative    No narrative on file        Substance Use History:   History   Alcohol Use No     History   Smoking Status    Former Smoker   Smokeless Tobacco    Never Used     History   Drug Use No       Physical Exam:   Vitals:   Blood Pressure: 141/87 (07/28/18 1328)  Pulse: 104 (07/28/18 1328)  Temperature: 97 8 °F (36 6 °C) (07/28/18 1328)  Temp Source: Oral (07/28/18 1328)  Respirations: 20 (07/28/18 1328)  Height: 5' 10" (177 8 cm) (07/28/18 0850)  Weight - Scale: 81 kg (178 lb 9 2 oz) (07/28/18 0850)  SpO2: 98 % (07/28/18 1328)  Body mass index is 25 62 kg/m²      General: NAD  HEENT: EOMI, anicteric, oral moist, no oral thrush or mucosal lesion, neck supple, no mass or JVD  Chest: CTAB, no wheeze/rales  Cardiac: RRR, S1/S2, No murmur  Abd: Soft, mild b/l lower abd tenderness, no guarding or rebound, BS+, no para-rectal lesion or inflammatory change  MSK: No LE pitting edema, pulses intact  Neuro: AAOx3, moving all extremities  Psychiatric: Mood with normal affect    Lab Results:     Results from last 7 days  Lab Units 07/28/18  0519   WBC Thousand/uL 1 91*   HEMOGLOBIN g/dL 10 6*   HEMATOCRIT % 34 0*   PLATELETS Thousands/uL 246   LYMPHO PCT % 20   MONO PCT MAN % 5   EOSINO PCT MANUAL % 5       Results from last 7 days  Lab Units 07/28/18  0519   SODIUM mmol/L 133*   POTASSIUM mmol/L 3 5   CHLORIDE mmol/L 101   CO2 mmol/L 18*   BUN mg/dL 47*   CREATININE mg/dL 2 95*   CALCIUM mg/dL 8 5   TOTAL PROTEIN g/dL 6 4   BILIRUBIN TOTAL mg/dL 0 70   ALK PHOS U/L 187*   ALT U/L 37   AST U/L 34   GLUCOSE RANDOM mg/dL 132           Recent Results (from the past 24 hour(s))   CBC and differential    Collection Time: 07/28/18  5:19 AM   Result Value Ref Range    WBC 1 91 (LL) 4 31 - 10 16 Thousand/uL    RBC 4 08 3 88 - 5 62 Million/uL    Hemoglobin 10 6 (L) 12 0 - 17 0 g/dL    Hematocrit 34 0 (L) 36 5 - 49 3 %    MCV 83 82 - 98 fL    MCH 26 0 (L) 26 8 - 34 3 pg    MCHC 31 2 (L) 31 4 - 37 4 g/dL    RDW 16 6 (H) 11 6 - 15 1 %    MPV 8 7 (L) 8 9 - 12 7 fL    Platelets 564 752 - 543 Thousands/uL    nRBC 0 /100 WBCs   Comprehensive metabolic panel    Collection Time: 07/28/18  5:19 AM   Result Value Ref Range    Sodium 133 (L) 136 - 145 mmol/L    Potassium 3 5 3 5 - 5 3 mmol/L    Chloride 101 100 - 108 mmol/L    CO2 18 (L) 21 - 32 mmol/L    Anion Gap 14 (H) 4 - 13 mmol/L    BUN 47 (H) 5 - 25 mg/dL    Creatinine 2 95 (H) 0 60 - 1 30 mg/dL    Glucose 132 65 - 140 mg/dL    Calcium 8 5 8 3 - 10 1 mg/dL    AST 34 5 - 45 U/L    ALT 37 12 - 78 U/L    Alkaline Phosphatase 187 (H) 46 - 116 U/L    Total Protein 6 4 6 4 - 8 2 g/dL    Albumin 2 5 (L) 3 5 - 5 0 g/dL    Total Bilirubin 0 70 0 20 - 1 00 mg/dL    eGFR 20 ml/min/1 73sq m   Lipase    Collection Time: 07/28/18  5:19 AM   Result Value Ref Range    Lipase 223 73 - 393 u/L   Green / Black tube on hold    Collection Time: 07/28/18  5:19 AM   Result Value Ref Range    Extra Tube Hold for add-ons      Manual Differential(PHLEBS Do Not Order)    Collection Time: 07/28/18  5:19 AM   Result Value Ref Range    Segmented % 47 43 - 75 %    Bands % 23 (H) 0 - 8 %    Lymphocytes % 20 14 - 44 %    Monocytes % 5 4 - 12 %    Eosinophils % 5 0 - 6 %    Basophils % 0 0 - 1 %    Absolute Neutrophils 1 34 (L) 1 85 - 7 62 Thousand/uL    Lymphocytes Absolute 0 38 (L) 0 60 - 4 47 Thousand/uL    Monocytes Absolute 0 10 0 00 - 1 22 Thousand/uL    Eosinophils Absolute 0 10 0 00 - 0 40 Thousand/uL    Basophils Absolute 0 00 0 00 - 0 10 Thousand/uL    Total Counted 100     Toxic Granulation Present     RBC Morphology Normal     Platelet Estimate Adequate Adequate   Magnesium    Collection Time: 07/28/18  5:19 AM   Result Value Ref Range    Magnesium 1 2 (L) 1 6 - 2 6 mg/dL   Phosphorus    Collection Time: 07/28/18  5:19 AM   Result Value Ref Range    Phosphorus 3 7 2 3 - 4 1 mg/dL   Lactic acid, plasma    Collection Time: 07/28/18  5:27 AM   Result Value Ref Range    LACTIC ACID 2 0 0 5 - 2 0 mmol/L   UA w Reflex to Microscopic    Collection Time: 07/28/18  7:39 AM   Result Value Ref Range    Color, UA Light Yellow     Clarity, UA Slightly Cloudy     Specific [place], UA 1 020 1 000 - 1 030    pH, UA 5 5 5 0 - 9 0    Leukocytes, UA Negative Negative    Nitrite, UA Negative Negative    Protein, UA 30 (1+) (A) Negative mg/dl    Glucose, UA Negative Negative mg/dl    Ketones, UA Negative Negative mg/dl    Urobilinogen, UA 0 2 0 2, 1 0 E U /dl E U /dl    Bilirubin, UA Negative Negative    Blood, UA Negative Negative   Urine Microscopic    Collection Time: 07/28/18  7:39 AM   Result Value Ref Range    RBC, UA None Seen None Seen, 0-5 /hpf    WBC, UA 0-1 (A) None Seen, 0-5, 5-55, 5-65 /hpf    Epithelial Cells Occasional None Seen, Occasional /hpf    Bacteria, UA None Seen None Seen, Occasional /hpf    Hyaline Casts, UA 0-1 (A) (none) /lpf    Fine granular casts 1-2 /lpf    MUCOUS THREADS Occasional (A) None Seen   Fingerstick Glucose (POCT)    Collection Time: 07/28/18  9:12 AM   Result Value Ref Range    POC Glucose 116 65 - 140 mg/dl       Imaging:  Ct Abdomen Pelvis Wo Contrast    Result Date: 7/28/2018  Narrative: CT ABDOMEN AND PELVIS WITHOUT IV CONTRAST INDICATION:   abd pain  Vomiting and diarrhea since last chemotherapy session which was on Monday  History of colon cancer  COMPARISON:  6/9/2014 TECHNIQUE:  CT examination of the abdomen and pelvis was performed without intravenous contrast   Axial, sagittal, and coronal 2D reformatted images were created from the source data and submitted for interpretation  Radiation dose length product (DLP) for this visit:  615 68 mGy-cm   This examination, like all CT scans performed in the [facility], was performed utilizing techniques to minimize radiation dose exposure, including the use of iterative  reconstruction and automated exposure control  Enteric contrast was not administered  FINDINGS: ABDOMEN LOWER CHEST:  Minimal dependent atelectasis is noted in the lung bases  There are some coronary artery calcification  LIVER/BILIARY TREE:  There is extensive hepatic metastatic disease throughout the liver with numerous large masses seen in the left and right hepatic lobes  These are new since the previous CT exam   The dominant left lobe mass measures 11 3 cm diameter  Masses in the posterior aspect of the right liver measure up to about 10 cm and a more inferiorly located dominant mass in the lower portion of the right hepatic lobe measures 11 4 cm  No gross evidence of biliary obstruction  GALLBLADDER:  Gallbladder is somewhat distended but does not appear inflamed  There might be some sludge in the lumen    It appears to have some layering density dependently  SPLEEN:  Unremarkable  PANCREAS:  Unremarkable  ADRENAL GLANDS:  Unremarkable  KIDNEYS/URETERS:  There is a horseshoe kidney with a relatively thin isthmic portion  There is no hydronephrosis or kidney stone identified  STOMACH AND BOWEL:  The patient's distal small bowel is thick-walled and inflamed appearing  This could indicate the presence of infectious or inflammatory enteritis or ischemic bowel  Correlate with appropriate lab work up  There does not appear to be any pneumatosis in the bowel wall  There is some equalization of enteric contents within the midportion of the small bowel which might indicate that there is some degree of bowel dysmotility or perhaps some minimal partial chronic small bowel obstruction although the small bowel loops do not seem overtly dilated at this time  There is fluid in the right side of the colon  There is diverticulosis along the left side of the colon  The stomach is unremarkable  APPENDIX:  A normal appendix was visualized  ABDOMINOPELVIC CAVITY:  There is no free air  There may be a wisp of pelvic free fluid  A couple small lymph nodes are visible in the right lower quadrant mesentery  No bulky adenopathy seen  VESSELS:  Patient is status post repair of infrarenal abdominal aortic aneurysm  No acute vascular pathology appreciated on this noncontrast exam  PELVIS REPRODUCTIVE ORGANS:  Unremarkable for patient's age  URINARY BLADDER:  Unremarkable  ABDOMINAL WALL/INGUINAL REGIONS:  There is a right inguinal hernia which contains fat and a small amount of fluid  OSSEOUS STRUCTURES:  There are couple small sclerotic densities in the right iliac bone which may be bone islands  There are no overtly destructive metastatic bone lesion seen  No fractures  Impression: Abnormal thickening and inflammation of the wall of the distal small bowel which could be infectious or inflammatory enteritis or ischemic bowel  Appropriate lab work up suggested  There is some equalization of small bowel contents proximal to this which might indicate dysmotility or a partial minimal chronic small bowel obstruction  (Bowel not abnormally dilated at this time, however) Large volume of metastatic tumor burden in the liver  Fluid in the right side of the colon suggesting diarrhea  Colonic diverticulosis also noted  Interval repair of abdominal aortic aneurysm without obvious complications  Horseshoe kidney Small right inguinal hernia containing fat and fluid Workstation performed: [id number]     Xr Chest 1 View Portable    Result Date: 7/28/2018  Narrative: CHEST INDICATION:   fever  Abdominal pain  Metastatic colon cancer  Patient on chemotherapy  COMPARISON:  6/22/2014 EXAM PERFORMED/VIEWS:  XR CHEST PORTABLE FINDINGS:  There is a right-sided infusion port catheter device present  Cardiomediastinal silhouette appears unremarkable  The lungs are clear  No pneumothorax or pleural effusion  Osseous structures appear within normal limits for patient age  Impression: No acute cardiopulmonary disease  Workstation performed: [id number]       XR chest 1 view portable   Final Result      No acute cardiopulmonary disease  Workstation performed: [id number]         CT abdomen pelvis wo contrast   Final Result      Abnormal thickening and inflammation of the wall of the distal small bowel which could be infectious or inflammatory enteritis or ischemic bowel  Appropriate lab work up suggested  There is some equalization of small bowel contents proximal to this which might indicate dysmotility or a partial minimal chronic small bowel obstruction  (Bowel not abnormally dilated at this time, however)      Large volume of metastatic tumor burden in the liver  Fluid in the right side of the colon suggesting diarrhea  Colonic diverticulosis also noted        Interval repair of abdominal aortic aneurysm without obvious complications  Horseshoe kidney      Small right inguinal hernia containing fat and fluid               Workstation performed: [id number]           [employer] Records Reviewed: Yes     Assessment/Plan:  Hospital Problem List:   Principal Problem:    Neutropenic fever ([facility] [address] )  Active Problems:    Abdominal pain    Nausea & vomiting    Diarrhea in adult patient    DERREK (acute kidney injury) ([facility] [address] )    Hypomagnesemia    Metastatic colon cancer to liver (HCC)    HTN (hypertension)    Other hyperlipidemia    GERD (gastroesophageal reflux disease)    74 y o  male with a history of metastatic colon cancer with liver mets, HTN, HLD, GERD, who presents with lower abd pain, N/V, and watery diarrhea for the past 3 days after recent chemotherapy with FOLFOX on 7/23/2018 at [facility] which was q2w cycle#2, his first time full-dose chemotherapy via a right upper chest infusaport  He was found neutropenic fever, DERREK  * Neutropenic fever ([facility] [address] )   Assessment & Plan    Recent chemotherapy with FOLFOX on 7/23/2018 at [facility] which was q2w cycle#2, his first time full-dose chemotherapy via a right upper chest infusaport  Pt's most recent labs (7/23/18): WBC 9 7, Hg 9 5, Bun/Cr 27/1 6  [address] = 1338 on 7/27/2018  CXR neg  Continue Zosyn iv renal dose adjustment  F/u blood cx, stool cx, ANC  Hem/Onc consult - Dr Luis E Gentile appreciated  Diarrhea in adult patient   Assessment & Plan    ? Chemo related vs gastroenteritis  Plan see above        Nausea & vomiting   Assessment & Plan    ? Chemo related vs gastroenteritis  Continue antiemetic prn        Abdominal pain   Assessment & Plan    CT abd/pelvis reviewed: Abnormal thickening and inflammation of the wall of the distal small bowel which could be infectious or inflammatory enteritis or ischemic bowel  Appropriate lab work up suggested  There is some equalization of small bowel contents proximal to this which might indicate dysmotility or a partial minimal chronic small bowel obstruction  (Bowel not abnormally dilated at this time, however)  Large volume of metastatic tumor burden in the liver  Fluid in the right side of the colon suggesting diarrhea  Colonic diverticulosis also noted  Interval repair of abdominal aortic aneurysm without obvious complications  Horseshoe kidney  Small right inguinal hernia containing fat and fluid  As per CT - ? Gastroenteritis, ? Colitis, ? Mild partial SBO  Doubt ischemia bowel given hx & clinical presentation, and lactic acid = 2  Monitor abd pain, WBC, lactic acid  F/u stool cx  Morphine 2mg iv prn  Clear liquid diet as tolerated  Consider Surgery consult if clinically deteriorating  Plan d/w pt, family  Hypomagnesemia   Assessment & Plan    Lytes repletion        DERREK (acute kidney injury) ([facility] [address] )   Assessment & Plan    More likely pre-renal due to hypovolemic from diarrhea and poor oral intake  Continue IVF hydration  Monitor Bun/Cr        GERD (gastroesophageal reflux disease)   Assessment & Plan    Continue PPI        Other hyperlipidemia   Assessment & Plan    Hold lipitor for now        HTN (hypertension)   Assessment & Plan    Hold losartan  Continue metoprolol        Metastatic colon cancer to liver [facility])   Assessment & Plan    Pt's primary Oncologist Dr Saurav Vargas contact# [phone]  Last chemo on 7/23/2018 with Fluorouracil oxaliplatin leucovorin, cycle# 2, first time full dose chemo as per pt and family, via RUL infusaport  ED has spoke to covering Oncologist Dr Nirali Alvarado contact# [phone] - recommends Zosyn until [address] >1500 (currently [phone])  DVT Prophylaxis: on SCD, OOB to chair, early ambulation  Code Status: Level 3 - DNAR and DNI  Disposition: anticipate d/c home once clinically improved  Plan d/w pt, family (son, wife) at bedside  Anticipated Length of Stay:  Patient will be admitted on an Inpatient basis with an anticipated length of stay of  > 2 midnights       Total Time for Visit, including Counseling/Coordination of Care: 45 minutes  Greater than 50% of this total time spent on direct patient counseling and coordination of care      Signed by:  Usha Melendez MD  Attending Hospitalist  Page#: [phone] (Hours 7am to 7pm)  7/28/2018 1:54 PM

## 2018-07-29 LAB
ANION GAP SERPL CALCULATED.3IONS-SCNC: 11 MMOL/L (ref 4–13)
BASOPHILS # BLD MANUAL: 0.02 THOUSAND/UL (ref 0–0.1)
BASOPHILS NFR MAR MANUAL: 1 % (ref 0–1)
BUN SERPL-MCNC: 39 MG/DL (ref 5–25)
CALCIUM SERPL-MCNC: 7.6 MG/DL (ref 8.3–10.1)
CHLORIDE SERPL-SCNC: 111 MMOL/L (ref 100–108)
CO2 SERPL-SCNC: 17 MMOL/L (ref 21–32)
CREAT SERPL-MCNC: 2.32 MG/DL (ref 0.6–1.3)
DOHLE BOD BLD QL SMEAR: PRESENT
EOSINOPHIL # BLD MANUAL: 0.08 THOUSAND/UL (ref 0–0.4)
EOSINOPHIL NFR BLD MANUAL: 4 % (ref 0–6)
ERYTHROCYTE [DISTWIDTH] IN BLOOD BY AUTOMATED COUNT: 16.6 % (ref 11.6–15.1)
GFR SERPL CREATININE-BSD FRML MDRD: 27 ML/MIN/1.73SQ M
GLUCOSE SERPL-MCNC: 109 MG/DL (ref 65–140)
HCT VFR BLD AUTO: 29.1 % (ref 36.5–49.3)
HGB BLD-MCNC: 8.7 G/DL (ref 12–17)
LACTATE SERPL-SCNC: 1.8 MMOL/L (ref 0.5–2)
LYMPHOCYTES # BLD AUTO: 0.34 THOUSAND/UL (ref 0.6–4.47)
LYMPHOCYTES # BLD AUTO: 16 % (ref 14–44)
MAGNESIUM SERPL-MCNC: 1.6 MG/DL (ref 1.6–2.6)
MCH RBC QN AUTO: 26.2 PG (ref 26.8–34.3)
MCHC RBC AUTO-ENTMCNC: 29.9 G/DL (ref 31.4–37.4)
MCV RBC AUTO: 88 FL (ref 82–98)
METAMYELOCYTES NFR BLD MANUAL: 7 % (ref 0–1)
MONOCYTES # BLD AUTO: 0.08 THOUSAND/UL (ref 0–1.22)
MONOCYTES NFR BLD: 4 % (ref 4–12)
MRSA NOSE QL CULT: NORMAL
NEUTROPHILS # BLD MANUAL: 1.43 THOUSAND/UL (ref 1.85–7.62)
NEUTS BAND NFR BLD MANUAL: 19 % (ref 0–8)
NEUTS SEG NFR BLD AUTO: 49 % (ref 43–75)
NRBC BLD AUTO-RTO: 0 /100 WBCS
OVALOCYTES BLD QL SMEAR: PRESENT
PLATELET # BLD AUTO: 187 THOUSANDS/UL (ref 149–390)
PLATELET BLD QL SMEAR: ADEQUATE
PMV BLD AUTO: 9 FL (ref 8.9–12.7)
POTASSIUM SERPL-SCNC: 3.8 MMOL/L (ref 3.5–5.3)
RBC # BLD AUTO: 3.32 MILLION/UL (ref 3.88–5.62)
RBC MORPH BLD: PRESENT
SODIUM SERPL-SCNC: 139 MMOL/L (ref 136–145)
TOTAL CELLS COUNTED SPEC: 100
TOXIC GRANULES BLD QL SMEAR: PRESENT
WBC # BLD AUTO: 2.11 THOUSAND/UL (ref 4.31–10.16)

## 2018-07-29 PROCEDURE — 87493 C DIFF AMPLIFIED PROBE: CPT | Performed by: INTERNAL MEDICINE

## 2018-07-29 PROCEDURE — 87505 NFCT AGENT DETECTION GI: CPT | Performed by: INTERNAL MEDICINE

## 2018-07-29 PROCEDURE — 80048 BASIC METABOLIC PNL TOTAL CA: CPT | Performed by: INTERNAL MEDICINE

## 2018-07-29 PROCEDURE — 85027 COMPLETE CBC AUTOMATED: CPT | Performed by: INTERNAL MEDICINE

## 2018-07-29 PROCEDURE — 99232 SBSQ HOSP IP/OBS MODERATE 35: CPT | Performed by: INTERNAL MEDICINE

## 2018-07-29 PROCEDURE — 83735 ASSAY OF MAGNESIUM: CPT | Performed by: INTERNAL MEDICINE

## 2018-07-29 PROCEDURE — 85007 BL SMEAR W/DIFF WBC COUNT: CPT | Performed by: INTERNAL MEDICINE

## 2018-07-29 PROCEDURE — 83605 ASSAY OF LACTIC ACID: CPT | Performed by: INTERNAL MEDICINE

## 2018-07-29 RX ORDER — ONDANSETRON 2 MG/ML
4 INJECTION INTRAMUSCULAR; INTRAVENOUS ONCE
Status: COMPLETED | OUTPATIENT
Start: 2018-07-29 | End: 2018-07-29

## 2018-07-29 RX ADMIN — SODIUM CHLORIDE AND POTASSIUM CHLORIDE 100 ML/HR: .9; .15 SOLUTION INTRAVENOUS at 01:17

## 2018-07-29 RX ADMIN — SODIUM CHLORIDE AND POTASSIUM CHLORIDE 100 ML/HR: .9; .15 SOLUTION INTRAVENOUS at 22:31

## 2018-07-29 RX ADMIN — PIPERACILLIN SODIUM,TAZOBACTAM SODIUM 2.25 G: 2; .25 INJECTION, POWDER, FOR SOLUTION INTRAVENOUS at 21:05

## 2018-07-29 RX ADMIN — PANTOPRAZOLE SODIUM 40 MG: 40 TABLET, DELAYED RELEASE ORAL at 06:07

## 2018-07-29 RX ADMIN — SODIUM CHLORIDE AND POTASSIUM CHLORIDE 100 ML/HR: .9; .15 SOLUTION INTRAVENOUS at 10:13

## 2018-07-29 RX ADMIN — TBO-FILGRASTIM 480 MCG: 480 INJECTION, SOLUTION SUBCUTANEOUS at 09:18

## 2018-07-29 RX ADMIN — PIPERACILLIN SODIUM,TAZOBACTAM SODIUM 2.25 G: 2; .25 INJECTION, POWDER, FOR SOLUTION INTRAVENOUS at 08:49

## 2018-07-29 RX ADMIN — PIPERACILLIN SODIUM,TAZOBACTAM SODIUM 2.25 G: 2; .25 INJECTION, POWDER, FOR SOLUTION INTRAVENOUS at 03:27

## 2018-07-29 RX ADMIN — PIPERACILLIN SODIUM,TAZOBACTAM SODIUM 2.25 G: 2; .25 INJECTION, POWDER, FOR SOLUTION INTRAVENOUS at 15:24

## 2018-07-29 RX ADMIN — FLUTICASONE FUROATE AND VILANTEROL TRIFENATATE 1 PUFF: 100; 25 POWDER RESPIRATORY (INHALATION) at 17:11

## 2018-07-29 RX ADMIN — METOPROLOL SUCCINATE 25 MG: 25 TABLET, FILM COATED, EXTENDED RELEASE ORAL at 09:17

## 2018-07-29 RX ADMIN — ONDANSETRON 8 MG: 2 INJECTION INTRAMUSCULAR; INTRAVENOUS at 12:26

## 2018-07-29 RX ADMIN — ONDANSETRON 4 MG: 2 INJECTION INTRAMUSCULAR; INTRAVENOUS at 06:52

## 2018-07-29 NOTE — PROGRESS NOTES
[facility] Progress Note  Patient: Aniya Madera 74 y o  male   MRN: [id number]  PCP: Earl Vieira MD  Unit/Bed#: [address] [place] [address] Encounter: [id number]  Date Of Visit: 18    Subjective:   1x watery diarrhea this am (the only diarrhea after admission)  He denied lower abd pain this am  Afebrile for 24h  No cough or dysuria    Objective:   Vitals:   Temp (24hrs), Av 1 °F (36 7 °C), Min:97 8 °F (36 6 °C), Max:98 4 °F (36 9 °C)    HR:  [] 97  Resp:  [18-20] 20  BP: (123-142)/(75-87) 123/86  SpO2:  [96 %-98 %] 96 %  Body mass index is 25 88 kg/m²         Intake/Output Summary (Last 24 hours) at 18 1224  Last data filed at 18 7428   Gross per 24 hour   Intake              950 ml   Output             1250 ml   Net             -300 ml       Physical Exam:   General: NAD  HEENT: EOMI, anicteric, oral moist, no oral thrush or mucosal lesion, neck supple, no mass or JVD  Chest: CTAB, no wheeze/rales  Cardiac: RRR, S1/S2, No murmur  Abd: S/ND/NT/BS+  MSK: No LE pitting edema, pulses intact  Neuro: AAOx3, moving all extremities  Psychiatric: Mood with normal affect    Additional Data:     Labs:    Results from last 7 days  Lab Units 18  0607   WBC Thousand/uL 2 11*   HEMOGLOBIN g/dL 8 7*   HEMATOCRIT % 29 1*   PLATELETS Thousands/uL 187   LYMPHO PCT % 16   MONO PCT MAN % 4   EOSINO PCT MANUAL % 4       Results from last 7 days  Lab Units 18  0607 18  0519   SODIUM mmol/L 139 133*   POTASSIUM mmol/L 3 8 3 5   CHLORIDE mmol/L 111* 101   CO2 mmol/L 17* 18*   BUN mg/dL 39* 47*   CREATININE mg/dL 2 32* 2 95*   CALCIUM mg/dL 7 6* 8 5   TOTAL PROTEIN g/dL  --  6 4   BILIRUBIN TOTAL mg/dL  --  0 70   ALK PHOS U/L  --  187*   ALT U/L  --  37   AST U/L  --  34   GLUCOSE RANDOM mg/dL 109 132           Recent Results (from the past 24 hour(s))   Lactic acid, plasma    Collection Time: 18  2:07 PM   Result Value Ref Range    LACTIC ACID 1 3 0 5 - 2 0 mmol/L   CBC and differential    Collection Time: 07/29/18  6:07 AM   Result Value Ref Range    WBC 2 11 (L) 4 31 - 10 16 Thousand/uL    RBC 3 32 (L) 3 88 - 5 62 Million/uL    Hemoglobin 8 7 (L) 12 0 - 17 0 g/dL    Hematocrit 29 1 (L) 36 5 - 49 3 %    MCV 88 82 - 98 fL    MCH 26 2 (L) 26 8 - 34 3 pg    MCHC 29 9 (L) 31 4 - 37 4 g/dL    RDW 16 6 (H) 11 6 - 15 1 %    MPV 9 0 8 9 - 12 7 fL    Platelets 685 244 - 108 Thousands/uL    nRBC 0 /100 WBCs   Basic metabolic panel    Collection Time: 07/29/18  6:07 AM   Result Value Ref Range    Sodium 139 136 - 145 mmol/L    Potassium 3 8 3 5 - 5 3 mmol/L    Chloride 111 (H) 100 - 108 mmol/L    CO2 17 (L) 21 - 32 mmol/L    Anion Gap 11 4 - 13 mmol/L    BUN 39 (H) 5 - 25 mg/dL    Creatinine 2 32 (H) 0 60 - 1 30 mg/dL    Glucose 109 65 - 140 mg/dL    Calcium 7 6 (L) 8 3 - 10 1 mg/dL    eGFR 27 ml/min/1 73sq m   Magnesium    Collection Time: 07/29/18  6:07 AM   Result Value Ref Range    Magnesium 1 6 1 6 - 2 6 mg/dL   Manual Differential(PHLEBS Do Not Order)    Collection Time: 07/29/18  6:07 AM   Result Value Ref Range    Segmented % 49 43 - 75 %    Bands % 19 (H) 0 - 8 %    Lymphocytes % 16 14 - 44 %    Monocytes % 4 4 - 12 %    Eosinophils % 4 0 - 6 %    Basophils % 1 0 - 1 %    Metamyelocytes% 7 (H) 0 - 1 %    Absolute Neutrophils 1 43 (L) 1 85 - 7 62 Thousand/uL    Lymphocytes Absolute 0 34 (L) 0 60 - 4 47 Thousand/uL    Monocytes Absolute 0 08 0 00 - 1 22 Thousand/uL    Eosinophils Absolute 0 08 0 00 - 0 40 Thousand/uL    Basophils Absolute 0 02 0 00 - 0 10 Thousand/uL    Total Counted 100     Dohle Bodies Present     Toxic Granulation Present     RBC Morphology Present     Ovalocytes Present     Platelet Estimate Adequate Adequate   Lactic acid, plasma    Collection Time: 07/29/18  7:58 AM   Result Value Ref Range    LACTIC ACID 1 8 0 5 - 2 0 mmol/L       Cultures:         Imaging:  Ct Abdomen Pelvis Wo Contrast    Result Date: 7/28/2018  Narrative: CT ABDOMEN AND PELVIS WITHOUT IV CONTRAST INDICATION:   abd pain  Vomiting and diarrhea since last chemotherapy session which was on Monday  History of colon cancer  COMPARISON:  6/9/2014 TECHNIQUE:  CT examination of the abdomen and pelvis was performed without intravenous contrast   Axial, sagittal, and coronal 2D reformatted images were created from the source data and submitted for interpretation  Radiation dose length product (DLP) for this visit:  615 68 mGy-cm   This examination, like all CT scans performed in the [facility], was performed utilizing techniques to minimize radiation dose exposure, including the use of iterative  reconstruction and automated exposure control  Enteric contrast was not administered  FINDINGS: ABDOMEN LOWER CHEST:  Minimal dependent atelectasis is noted in the lung bases  There are some coronary artery calcification  LIVER/BILIARY TREE:  There is extensive hepatic metastatic disease throughout the liver with numerous large masses seen in the left and right hepatic lobes  These are new since the previous CT exam   The dominant left lobe mass measures 11 3 cm diameter  Masses in the posterior aspect of the right liver measure up to about 10 cm and a more inferiorly located dominant mass in the lower portion of the right hepatic lobe measures 11 4 cm  No gross evidence of biliary obstruction  GALLBLADDER:  Gallbladder is somewhat distended but does not appear inflamed  There might be some sludge in the lumen  It appears to have some layering density dependently  SPLEEN:  Unremarkable  PANCREAS:  Unremarkable  ADRENAL GLANDS:  Unremarkable  KIDNEYS/URETERS:  There is a horseshoe kidney with a relatively thin isthmic portion  There is no hydronephrosis or kidney stone identified  STOMACH AND BOWEL:  The patient's distal small bowel is thick-walled and inflamed appearing  This could indicate the presence of infectious or inflammatory enteritis or ischemic bowel    Correlate with appropriate lab work up  There does not appear to be any pneumatosis in the bowel wall  There is some equalization of enteric contents within the midportion of the small bowel which might indicate that there is some degree of bowel dysmotility or perhaps some minimal partial chronic small bowel obstruction although the small bowel loops do not seem overtly dilated at this time  There is fluid in the right side of the colon  There is diverticulosis along the left side of the colon  The stomach is unremarkable  APPENDIX:  A normal appendix was visualized  ABDOMINOPELVIC CAVITY:  There is no free air  There may be a wisp of pelvic free fluid  A couple small lymph nodes are visible in the right lower quadrant mesentery  No bulky adenopathy seen  VESSELS:  Patient is status post repair of infrarenal abdominal aortic aneurysm  No acute vascular pathology appreciated on this noncontrast exam  PELVIS REPRODUCTIVE ORGANS:  Unremarkable for patient's age  URINARY BLADDER:  Unremarkable  ABDOMINAL WALL/INGUINAL REGIONS:  There is a right inguinal hernia which contains fat and a small amount of fluid  OSSEOUS STRUCTURES:  There are couple small sclerotic densities in the right iliac bone which may be bone islands  There are no overtly destructive metastatic bone lesion seen  No fractures  Impression: Abnormal thickening and inflammation of the wall of the distal small bowel which could be infectious or inflammatory enteritis or ischemic bowel  Appropriate lab work up suggested  There is some equalization of small bowel contents proximal to this which might indicate dysmotility or a partial minimal chronic small bowel obstruction  (Bowel not abnormally dilated at this time, however) Large volume of metastatic tumor burden in the liver  Fluid in the right side of the colon suggesting diarrhea  Colonic diverticulosis also noted  Interval repair of abdominal aortic aneurysm without obvious complications   Horseshoe kidney Small right inguinal hernia containing fat and fluid Workstation performed: [id number]     Xr Chest 1 View Portable    Result Date: 7/28/2018  Narrative: CHEST INDICATION:   fever  Abdominal pain  Metastatic colon cancer  Patient on chemotherapy  COMPARISON:  6/22/2014 EXAM PERFORMED/VIEWS:  XR CHEST PORTABLE FINDINGS:  There is a right-sided infusion port catheter device present  Cardiomediastinal silhouette appears unremarkable  The lungs are clear  No pneumothorax or pleural effusion  Osseous structures appear within normal limits for patient age  Impression: No acute cardiopulmonary disease   Workstation performed: [id number]     Imaging Reports Reviewed by myself    Last 24 Hours Medication List:     Current Facility-Administered Medications:     acetaminophen (TYLENOL) tablet 650 mg, 650 mg, Oral, Once, Olpat Sheehan,     acetaminophen (TYLENOL) tablet 650 mg, 650 mg, Oral, Q6H PRN, Joana Be MD    fluticasone-vilanterol (BREO ELLIPTA) 100-25 mcg/inh inhaler 1 puff, 1 puff, Inhalation, Daily, Joana Be MD, 1 puff at 07/28/18 1532    metoprolol succinate (TOPROL-XL) 24 hr tablet 25 mg, 25 mg, Oral, Daily, Joana Be MD, 25 mg at 07/29/18 0917    morphine injection 2 mg, 2 mg, Intravenous, Q3H PRN, Joana Be MD, 2 mg at 07/28/18 1832    ondansetron (ZOFRAN) 8 mg in sodium chloride 0 9 % 50 mL IVPB, 8 mg, Intravenous, Q6H PRN, Joana Be MD, Last Rate: 200 mL/hr at 07/28/18 1115, 8 mg at 07/28/18 1115    pantoprazole (PROTONIX) EC tablet 40 mg, 40 mg, Oral, Early Morning, Joana Be MD, 40 mg at 07/29/18 0607    piperacillin-tazobactam (ZOSYN) 2 25 g in sodium chloride 0 9 % 50 mL IVPB, 2 25 g, Intravenous, Q6H, Joana Be MD, Last Rate: 100 mL/hr at 07/29/18 0849, 2 25 g at 07/29/18 0849    sodium chloride 0 9 % with KCl 20 mEq/L infusion (premix), 100 mL/hr, Intravenous, Continuous, Joana Be MD, Last Rate: 100 mL/hr at 07/29/18 1013, 100 mL/hr at 07/29/18 1013 Assessment and Plans:  Hospital Problem List:   Principal Problem:    Neutropenic fever ([address] )  Active Problems:    Abdominal pain    Nausea & vomiting    Diarrhea in adult patient    DERREK (acute kidney injury) ([facility] )    Hypomagnesemia    Metastatic colon cancer to liver (HCC)    HTN (hypertension)    Other hyperlipidemia    GERD (gastroesophageal reflux disease)    74 y o  male with a history of metastatic colon cancer with liver mets, HTN, HLD, GERD, who presents with lower abd pain, N/V, and watery diarrhea for the past 3 days after recent chemotherapy with F[place]FOX on 7[place]23/[place] [facility] which was q2w cycle#2, his first time full-dose chemotherapy via a right upper chest infusaport  He was found neutropenic fever, DERREK  * Neutropenic fever ([facility] [address] )   Assessment & Plan    Recent chemotherapy with FOLFOX on 7[place]23/2018 [place] [facility] which was q2w cycle#2, his first time full-dose chemotherapy via a right upper chest infusaport  Pt's most recent labs (7/23/18): WBC 9 7, Hg 9 5, Bun/Cr 27/1 6  CXR neg  ANC = 1340 ->  1430  Hem/Onc consult - Dr Lawyer Robert appreciated  Continue Zosyn iv renal dose adjustment  Continue G-CSF Granix day#2/2  F/u blood cx, stool cx, ANC        Diarrhea in adult patient   Assessment & Plan    ? Chemo related vs gastroenteritis  Plan see above        Nausea & vomiting   Assessment & Plan    ? Chemo related vs gastroenteritis  Continue antiemetic prn        Abdominal pain   Assessment & Plan    CT abd/pelvis reviewed: Abnormal thickening and inflammation of the wall of the distal small bowel which could be infectious or inflammatory enteritis or ischemic bowel  Appropriate lab work up suggested  There is some equalization of small bowel contents proximal to this which might indicate dysmotility or a partial minimal chronic small bowel obstruction  (Bowel not abnormally dilated at this time, however)  Large volume of metastatic tumor burden in the liver   Fluid in the right side of the colon suggesting diarrhea  Colonic diverticulosis also noted  Interval repair of abdominal aortic aneurysm without obvious complications  Horseshoe kidney  Small right inguinal hernia containing fat and fluid  As per CT - ? Gastroenteritis, ? Colitis, ? Mild partial SBO  Doubt ischemia bowel given hx & clinical presentation, lactic acid normalized, and his abd pain resolved today  Advance diet as tolerated - now on full liquid        Hypomagnesemia   Assessment & Plan    Lytes repletion        DERREK (acute kidney injury) ([facility] [address] )   Assessment & Plan    More likely pre-renal due to hypovolemic from diarrhea and poor oral intake  Cr improving with IVF hydration - decrease to 50ml/hr today        GERD (gastroesophageal reflux disease)   Assessment & Plan    Continue PPI        Other hyperlipidemia   Assessment & Plan    Hold lipitor for now        HTN (hypertension)   Assessment & Plan    Hold losartan  Continue metoprolol        Metastatic colon cancer to liver [facility])   Assessment & Plan    Pt's primary Oncologist Dr Beltran Rome contact# [phone]  Last chemo on 7/23/2018 with Fluorouracil oxaliplatin leucovorin, cycle# 2, first time full dose chemo as per pt and family, via RUL infusaport  ED has spoke to covering Oncologist Dr Maria Alejandra Domingo contact# [phone] - recommends Zosyn until [address] >1500 (currently [phone])  DVT Prophylaxis: on SCD, early ambulation  Code Status: Level 3 - DNAR and DNI  Disposition: anticipate d/c home pending on clinical course, likely within 24h if afebrile and neg culture      Signed by:  Atilio Rodriguez MD  Attending Hospitalist  Page#: [phone] (Hours 7am to 7pm)  7/29/2018 12:24 PM

## 2018-07-30 LAB
ANION GAP SERPL CALCULATED.3IONS-SCNC: 13 MMOL/L (ref 4–13)
BASOPHILS # BLD MANUAL: 0.03 THOUSAND/UL (ref 0–0.1)
BASOPHILS NFR MAR MANUAL: 1 % (ref 0–1)
BUN SERPL-MCNC: 32 MG/DL (ref 5–25)
BURR CELLS BLD QL SMEAR: PRESENT
C DIFF TOX GENS STL QL NAA+PROBE: NORMAL
CALCIUM SERPL-MCNC: 8 MG/DL (ref 8.3–10.1)
CAMPYLOBACTER DNA SPEC NAA+PROBE: NORMAL
CHLORIDE SERPL-SCNC: 114 MMOL/L (ref 100–108)
CO2 SERPL-SCNC: 16 MMOL/L (ref 21–32)
CREAT SERPL-MCNC: 2.01 MG/DL (ref 0.6–1.3)
EOSINOPHIL # BLD MANUAL: 0.19 THOUSAND/UL (ref 0–0.4)
EOSINOPHIL NFR BLD MANUAL: 6 % (ref 0–6)
ERYTHROCYTE [DISTWIDTH] IN BLOOD BY AUTOMATED COUNT: 16.6 % (ref 11.6–15.1)
GFR SERPL CREATININE-BSD FRML MDRD: 32 ML/MIN/1.73SQ M
GLUCOSE SERPL-MCNC: 101 MG/DL (ref 65–140)
HCT VFR BLD AUTO: 28.7 % (ref 36.5–49.3)
HGB BLD-MCNC: 8.6 G/DL (ref 12–17)
LYMPHOCYTES # BLD AUTO: 0.68 THOUSAND/UL (ref 0.6–4.47)
LYMPHOCYTES # BLD AUTO: 21 % (ref 14–44)
MAGNESIUM SERPL-MCNC: 1.8 MG/DL (ref 1.6–2.6)
MCH RBC QN AUTO: 26.4 PG (ref 26.8–34.3)
MCHC RBC AUTO-ENTMCNC: 30 G/DL (ref 31.4–37.4)
MCV RBC AUTO: 88 FL (ref 82–98)
METAMYELOCYTES NFR BLD MANUAL: 2 % (ref 0–1)
MONOCYTES # BLD AUTO: 0.13 THOUSAND/UL (ref 0–1.22)
MONOCYTES NFR BLD: 4 % (ref 4–12)
NEUTROPHILS # BLD MANUAL: 2.14 THOUSAND/UL (ref 1.85–7.62)
NEUTS BAND NFR BLD MANUAL: 13 % (ref 0–8)
NEUTS SEG NFR BLD AUTO: 53 % (ref 43–75)
NRBC BLD AUTO-RTO: 0 /100 WBCS
NRBC BLD AUTO-RTO: 1 /100 WBC (ref 0–2)
PLATELET # BLD AUTO: 169 THOUSANDS/UL (ref 149–390)
PLATELET BLD QL SMEAR: ADEQUATE
PMV BLD AUTO: 9.1 FL (ref 8.9–12.7)
POTASSIUM SERPL-SCNC: 3.8 MMOL/L (ref 3.5–5.3)
RBC # BLD AUTO: 3.26 MILLION/UL (ref 3.88–5.62)
RBC MORPH BLD: PRESENT
SALMONELLA DNA SPEC QL NAA+PROBE: NORMAL
SHIGA TOXIN STX GENE SPEC NAA+PROBE: NORMAL
SHIGELLA DNA SPEC QL NAA+PROBE: NORMAL
SODIUM SERPL-SCNC: 143 MMOL/L (ref 136–145)
TOTAL CELLS COUNTED SPEC: 100
TOXIC GRANULES BLD QL SMEAR: PRESENT
WBC # BLD AUTO: 3.24 THOUSAND/UL (ref 4.31–10.16)

## 2018-07-30 PROCEDURE — 99232 SBSQ HOSP IP/OBS MODERATE 35: CPT | Performed by: FAMILY MEDICINE

## 2018-07-30 PROCEDURE — 80048 BASIC METABOLIC PNL TOTAL CA: CPT | Performed by: INTERNAL MEDICINE

## 2018-07-30 PROCEDURE — 85027 COMPLETE CBC AUTOMATED: CPT | Performed by: INTERNAL MEDICINE

## 2018-07-30 PROCEDURE — 83735 ASSAY OF MAGNESIUM: CPT | Performed by: INTERNAL MEDICINE

## 2018-07-30 PROCEDURE — 85007 BL SMEAR W/DIFF WBC COUNT: CPT | Performed by: INTERNAL MEDICINE

## 2018-07-30 PROCEDURE — 99233 SBSQ HOSP IP/OBS HIGH 50: CPT | Performed by: INTERNAL MEDICINE

## 2018-07-30 RX ORDER — LOPERAMIDE HYDROCHLORIDE 2 MG/1
2 CAPSULE ORAL 4 TIMES DAILY PRN
Status: DISCONTINUED | OUTPATIENT
Start: 2018-07-30 | End: 2018-07-31

## 2018-07-30 RX ORDER — LOPERAMIDE HYDROCHLORIDE 2 MG/1
4 CAPSULE ORAL ONCE
Status: COMPLETED | OUTPATIENT
Start: 2018-07-30 | End: 2018-07-30

## 2018-07-30 RX ADMIN — LOPERAMIDE HYDROCHLORIDE 2 MG: 2 CAPSULE ORAL at 17:54

## 2018-07-30 RX ADMIN — LOPERAMIDE HYDROCHLORIDE 4 MG: 2 CAPSULE ORAL at 14:27

## 2018-07-30 RX ADMIN — SODIUM CHLORIDE AND POTASSIUM CHLORIDE 100 ML/HR: .9; .15 SOLUTION INTRAVENOUS at 21:53

## 2018-07-30 RX ADMIN — SODIUM CHLORIDE AND POTASSIUM CHLORIDE 100 ML/HR: .9; .15 SOLUTION INTRAVENOUS at 12:23

## 2018-07-30 RX ADMIN — PANTOPRAZOLE SODIUM 40 MG: 40 TABLET, DELAYED RELEASE ORAL at 05:15

## 2018-07-30 RX ADMIN — PIPERACILLIN SODIUM,TAZOBACTAM SODIUM 2.25 G: 2; .25 INJECTION, POWDER, FOR SOLUTION INTRAVENOUS at 08:38

## 2018-07-30 RX ADMIN — LOPERAMIDE HYDROCHLORIDE 2 MG: 2 CAPSULE ORAL at 21:52

## 2018-07-30 RX ADMIN — FLUTICASONE FUROATE AND VILANTEROL TRIFENATATE 1 PUFF: 100; 25 POWDER RESPIRATORY (INHALATION) at 17:52

## 2018-07-30 RX ADMIN — PIPERACILLIN SODIUM,TAZOBACTAM SODIUM 2.25 G: 2; .25 INJECTION, POWDER, FOR SOLUTION INTRAVENOUS at 02:47

## 2018-07-30 RX ADMIN — METOPROLOL SUCCINATE 25 MG: 25 TABLET, FILM COATED, EXTENDED RELEASE ORAL at 08:38

## 2018-07-30 NOTE — PROGRESS NOTES
Hematology - Oncology Progress Note    Aga Eckert, 1945, [id number]  [address]      Impression and plan:    58-year-old male with recent diagnosis of metastatic M1 colon cancer (liver) presently on FOLFOX chemotherapy  Issues:     1  Neutropenic fevers  Fevers have abated, patient feels much better  Antibiotics have been discontinued  Patient received 2 days of G-CSF, white count is higher/better  The plan is to monitor the [facility]  No additional G-CSF is planned at this time  Patient will eventually return to his primary oncologist to discuss options for the next cycle of FOLFOX      2  GI issues - much better  Patient's abdominal pain is much less/better than before, appetite has returned  No recent diarrhea or GI bleeding  This will need to be monitored once patient is discharged  C diff titers were negative      3  Colon cancer with liver metastases  Chemotherapy is obviously on hold until patient is better and cleared of his infection  Patient and primary oncologist will eventually need to reassess the present regimen (dose reduction/manipulation etc)      4  Question of Avastin  Commonly, bevacizumab is given with FOLFOX upfront to patients with metastatic colon cancer  I asked Mr Mitesh Lowry about this specifically  Patient is not sure if he is on Avastin - patient will discuss this with his primary oncologist   As discussed previously, there is a concern for surgical procedures specifically on the bowel in any patient receiving bevacizumab within the proceeding month        [employer]  [email or web address]       6  Elevated BUN and creatinine -improving  Patient is on IV fluids  Trend is being monitored  ____________________________________________________________________________________________________    Chief complaint:  Abdominal pain, decreased appetite, nausea - now better    History of present illness: 58-year-old male admitted with the above    Mr Mitesh Lowry was recently diagnosed with metastatic (liver) cecal/colon adenocarcinoma and recently started chemotherapy  The 1st cycle was actually broken up into different pieces; patient only recently received his 1st complete regular cycle of FOLFOX  Patient began to have nausea, severe diarrhea and abdominal pain prompting an ER visit  CT of the abdomen and pelvis demonstrated "abnormal thickening and inflammation of the wall of the distal small bowel which could be infectious or inflammatory enteritis or ischemic bowel"  Patient has been admitted for pain control, rehydration and workup and treatment of the diarrhea      Presently patient states feeling better than before  Abdominal pain is gone  No recent diarrhea or GI bleeding  Appetite has returned, no nausea  Activities have improved  No fevers, chills or sweats  Patient is looking for to being discharged      Hospital medications list:    Current Facility-Administered Medications:  acetaminophen 650 mg Oral Once Olubuselizabeth Irbysaanselmo, DO    acetaminophen 650 mg Oral Q6H PRN Heidy Caruso MD    fluticasone-vilanterol 1 puff Inhalation Daily Heidy Caruso MD    metoprolol succinate 25 mg Oral Daily Heidy Caruso MD    morphine injection 2 mg Intravenous Q3H PRN Heidy Caruso MD    ondansetron 8 mg Intravenous Q6H PRN Heidy Caruso MD Last Rate: 8 mg (07/29/18 1226)   pantoprazole 40 mg Oral Early Morning Heidy Caruso MD    piperacillin-tazobactam 2 25 g Intravenous Q6H Heidy Caruso MD Last Rate: 2 25 g (07/30/18 0838)   sodium chloride 0 9 % with KCl 20 mEq/L 100 mL/hr Intravenous Continuous Heidy Caruso MD Last Rate: 100 mL/hr (07/29/18 2231)     Physical exam  /81 (BP Location: Left arm)   Pulse 88   Temp (!) 97 3 °F (36 3 °C) (Tympanic)   Resp 18   Ht 5' 10" (1 778 m)   Wt 81 8 kg (180 lb 5 4 oz)   SpO2 97%   BMI 25 88 kg/m²   Constitutional: Well formed, well-nourished  in no apparent distress  Well-nourished male, no acute distress   HENT:   Head: Normocephalic and atraumatic  Right Ear: External ear normal    Left Ear: External ear normal    Nose: Nose normal    Mouth/Throat: Oropharynx is clear and moist    Eyes: Conjunctivae and EOM are normal  Pupils are equal, round, and reactive to light  Neck: Normal range of motion  Neck supple  Cardiovascular: Normal rate, regular rhythm, normal heart sounds and intact distal pulses  Pulmonary/Chest: Effort normal and breath sounds normal    Abdominal: Soft  Bowel sounds are normal    +bowel sounds, nontender, no rigidity or rebound  Musculoskeletal: Normal range of motion  Neurological: He is alert and oriented to person, place, and time  He has normal reflexes  Skin: Skin is warm  Right anterior chest wall port placed recently with he well healing suture line   Psychiatric: He has a normal mood and affect  His behavior is normal  Judgment and thought content normal    Extremities:  No edema, no cords, pulses are 1+  Lymphatics:  No adenopathy in the neck, supraclavicular region, axilla and groin bilaterally    Laboratory test results    Results for Robert Posey (MRN [id number]) as of 7/30/2018 17:11   Ref  Range 7/29/2018 06:31   C DIFF TOXIN B Latest Ref Range: NEGATIVE for C difficle toxin by PCR  NEGATIVE for C di    Results for Robert Posey (MRN [id number]) as of 7/30/2018 11:24   Ref   Range 7/28/2018 05:19 7/29/2018 06:07 7/30/2018 06:40   WBC Latest Ref Range: 4 31 - 10 16 Thousand/uL 1 91 (LL) 2 11 (L) 3 24 (L)   RBC Latest Ref Range: 3 88 - 5 62 Million/uL 4 08 3 32 (L) 3 26 (L)   Hemoglobin Latest Ref Range: 12 0 - 17 0 g/dL 10 6 (L) 8 7 (L) 8 6 (L)   Hematocrit Latest Ref Range: 36 5 - 49 3 % 34 0 (L) 29 1 (L) 28 7 (L)   MCV Latest Ref Range: 82 - 98 fL 83 88 88   MCH Latest Ref Range: 26 8 - 34 3 pg 26 0 (L) 26 2 (L) 26 4 (L)   MCHC Latest Ref Range: 31 4 - 37 4 g/dL 31 2 (L) 29 9 (L) 30 0 (L)   RDW Latest Ref Range: 11 6 - 15 1 % 16 6 (H) 16 6 (H) 16 6 (H)   Platelets Latest Ref Range: 149 - 390 Thousands/uL 246 187 169   MPV Latest Ref Range: 8 9 - 12 7 fL 8 7 (L) 9 0 9 1   Platelet Estimate Latest Ref Range: Adequate  Adequate Adequate Adequate   nRBC Latest Units: /100 WBCs 0 0 0   Segs Relative Latest Ref Range: 43 - 75 % 47 49 53   External Abs Neutrophils Latest Ref Range: 1 85 - 7 62 Thousand/uL 1 34 (L) 1 43 (L) 2 14   Bands Relative Latest Ref Range: 0 - 8 % 23 (H) 19 (H) 13 (H)   Lymphocytes % Latest Ref Range: 14 - 44 % 20 16 21   Eosinophils % Latest Ref Range: 0 - 6 % 5 4 6       Results for Julio Cesar Reyes (MRN [id number]) as of 7/30/2018 11:24   Ref  Range 7/28/2018 05:19 7/28/2018 05:27 7/28/2018 14:07 7/29/2018 06:07 7/29/2018 07:58 7/30/2018 06:40   BUN Latest Ref Range: 5 - 25 mg/dL 47 (H)   39 (H)  32 (H)   Creatinine Latest Ref Range: 0 60 - 1 30 mg/dL 2 95 (H)   2 32 (H)  2 01 (H)       Imaging     07/28/2018 CT scan of the abdomen pelvis     Abnormal thickening and inflammation of the wall of the distal small bowel which could be infectious or inflammatory enteritis or ischemic bowel   Appropriate lab work up suggested  There is some equalization of small bowel contents proximal to this which might indicate dysmotility or a partial minimal chronic small bowel obstruction   (Bowel not abnormally dilated at this time, however)  Large volume of metastatic tumor burden in the liver  Fluid in the right side of the colon suggesting diarrhea   Colonic diverticulosis also noted  Interval repair of abdominal aortic aneurysm without obvious complications    Horseshoe kidney   Small right inguinal hernia containing fat and fluid     Pathology     No pathology results are available for review

## 2018-07-30 NOTE — PROGRESS NOTES
[facility] Progress Note  Patient: Cassy Mishra 74 y o  male   MRN: [id number]  PCP: Naresh Farrell MD  Unit/Bed#: [address] [place] [address] Encounter: [id number]  Date Of Visit: 07/30/18    Problem List:    Principal Problem:    Neutropenic fever ([facility] )  Active Problems:    DERREK (acute kidney injury) ([facility] )    Diarrhea in adult patient    Abdominal pain    Metastatic colon cancer to liver ([facility] )    Nausea & vomiting    Hypomagnesemia    Essential hypertension    Other hyperlipidemia    GERD (gastroesophageal reflux disease)      Assessment & Plan:    * Neutropenic fever ([facility] )   Assessment & Plan    Recent chemotherapy with FOLFOX on 7/23/2018 at [facility] which was q2w cycle#2, his first time full-dose chemotherapy via a right upper chest infusaport  CXR neg, UA not convincing for UTI  Blood cultures have been negative as well  ANC has now improved and Zosyn discontinued  ED had talked with covering Oncologist Dr Beltran Callahan contact # [phone] who recommended Zosyn until [facility] >1500  Hem/Onc consult appreciated  Status post Granix x2 doses        Diarrhea in adult patient   Assessment & Plan    ? Chemo related vs gastroenteritis versus due to antibiotic use  C diff has been negative        DERREK (acute kidney injury) ([facility] )   Assessment & Plan    More likely pre-renal due to from diarrhea and poor oral intake  Creatinine improving with IVF hydration, although not back at baseline  Repeat lab work in the a m  Nausea & vomiting   Assessment & Plan    ? Chemo related vs gastroenteritis  Vomiting has resolved        Metastatic colon cancer to liver [facility])   Assessment & Plan    Pt's primary Oncologist Dr Obdulia Salmeron contact # [phone]  Last chemo on 7/23/2018 with Fluorouracil oxaliplatin leucovorin, cycle# 2, first time full dose chemo as per pt and family, via RUL infusaport    ED had talked with covering Oncologist Dr Beltran Callahan contact # [phone] who recommended Zosyn until ANC >1500        Abdominal pain Assessment & Plan    Now resolved  CT abd/pelvis reviewed: Abnormal thickening and inflammation of the wall of the distal small bowel which could be infectious or inflammatory enteritis or ischemic bowel  Possible dysmotility or a partial minimal chronic small bowel obstruction  Large volume of metastatic tumor burden in the liver  Fluid in the right side of the colon suggesting diarrhea  Interval repair of abdominal aortic aneurysm without obvious complications  Horseshoe kidney  Small right inguinal hernia containing fat and fluid  Doubt ischemic bowel given hx & clinical presentation, lactic acid normal        GERD (gastroesophageal reflux disease)   Assessment & Plan    Continue Protonix        Other hyperlipidemia   Assessment & Plan    Holding lipitor for now        Essential hypertension   Assessment & Plan    Holding losartan  Continue Toprol-XL        Hypomagnesemia   Assessment & Plan    Improved with repletion              VTE Pharmacologic Prophylaxis:   Pharmacologic: Pharmacologic VTE Prophylaxis contraindicated due to anemia, patient ambulatory  Mechanical VTE Prophylaxis in Place: Yes    Patient Centered Rounds: I have performed bedside rounds with nursing staff today  Discussions with Specialists or Other Care Team Provider: Yes    Education and Discussions with Family / Patient:Yes    Time Spent for Care: 30 minutes  More than 50% of total time spent on counseling and coordination of care as described above  Current Length of Stay: 2 day(s)    Current Patient Status: Inpatient     Discharge Plan: home    Code Status: Level 3 - DNAR and DNI    Certification Statement: The patient will continue to require additional inpatient hospital stay due to Persistent diarrhea      Subjective:   Reports multiple episodes of diarrhea    Denies any abdominal pain    Objective:     Vitals:   Temp (24hrs), Av 9 °F (36 6 °C), Min:97 3 °F (36 3 °C), Max:98 4 °F (36 9 °C)    HR:  [88-95] 95  Resp: [18] 18  BP: (129-141)/(81-88) 141/88  SpO2:  [90 %-97 %] 90 %  Body mass index is 25 88 kg/m²  Input and Output Summary (last 24 hours): Intake/Output Summary (Last 24 hours) at 07/30/18 1810  Last data filed at 07/30/18 1701   Gross per 24 hour   Intake              850 ml   Output              425 ml   Net              425 ml       Physical Exam:     Physical Exam   Constitutional: He is oriented to person, place, and time  He appears well-developed and well-nourished  No distress  HENT:   Head: Normocephalic and atraumatic  Mouth/Throat: Oropharynx is clear and moist    Eyes: EOM are normal  Right eye exhibits no discharge  Left eye exhibits no discharge  No scleral icterus  Neck: Neck supple  No tracheal deviation present  Cardiovascular: Normal rate and regular rhythm  Pulmonary/Chest: Effort normal and breath sounds normal  No respiratory distress  He has no wheezes  He has no rales  Abdominal: Soft  Bowel sounds are normal  He exhibits no distension  There is no tenderness  Musculoskeletal: He exhibits no edema  Neurological: He is alert and oriented to person, place, and time  No cranial nerve deficit  Skin: He is not diaphoretic  Psychiatric: He has a normal mood and affect         Additional Data:     Labs:      Results from last 7 days  Lab Units 07/30/18  0640   WBC Thousand/uL 3 24*   HEMOGLOBIN g/dL 8 6*   HEMATOCRIT % 28 7*   PLATELETS Thousands/uL 169   LYMPHO PCT % 21   MONO PCT MAN % 4   EOSINO PCT MANUAL % 6       Results from last 7 days  Lab Units 07/30/18  0640  07/28/18  0519   SODIUM mmol/L 143  < > 133*   POTASSIUM mmol/L 3 8  < > 3 5   CHLORIDE mmol/L 114*  < > 101   CO2 mmol/L 16*  < > 18*   BUN mg/dL 32*  < > 47*   CREATININE mg/dL 2 01*  < > 2 95*   CALCIUM mg/dL 8 0*  < > 8 5   TOTAL PROTEIN g/dL  --   --  6 4   BILIRUBIN TOTAL mg/dL  --   --  0 70   ALK PHOS U/L  --   --  187*   ALT U/L  --   --  37   AST U/L  --   --  34   GLUCOSE RANDOM mg/dL 101  < > 132   < > = values in this interval not displayed  * I Have Reviewed All Lab Data Listed Above  * Additional Pertinent Lab Tests Reviewed: All Labs For Current Hospital Admission Reviewed    Imaging:  Ct Abdomen Pelvis Wo Contrast    Result Date: 7/28/2018  Narrative: CT ABDOMEN AND PELVIS WITHOUT IV CONTRAST INDICATION:   abd pain  Vomiting and diarrhea since last chemotherapy session which was on Monday  History of colon cancer  COMPARISON:  6/9/2014 TECHNIQUE:  CT examination of the abdomen and pelvis was performed without intravenous contrast   Axial, sagittal, and coronal 2D reformatted images were created from the source data and submitted for interpretation  Radiation dose length product (DLP) for this visit:  615 68 mGy-cm   This examination, like all CT scans performed in the [facility], was performed utilizing techniques to minimize radiation dose exposure, including the use of iterative  reconstruction and automated exposure control  Enteric contrast was not administered  FINDINGS: ABDOMEN LOWER CHEST:  Minimal dependent atelectasis is noted in the lung bases  There are some coronary artery calcification  LIVER/BILIARY TREE:  There is extensive hepatic metastatic disease throughout the liver with numerous large masses seen in the left and right hepatic lobes  These are new since the previous CT exam   The dominant left lobe mass measures 11 3 cm diameter  Masses in the posterior aspect of the right liver measure up to about 10 cm and a more inferiorly located dominant mass in the lower portion of the right hepatic lobe measures 11 4 cm  No gross evidence of biliary obstruction  GALLBLADDER:  Gallbladder is somewhat distended but does not appear inflamed  There might be some sludge in the lumen  It appears to have some layering density dependently  SPLEEN:  Unremarkable  PANCREAS:  Unremarkable  ADRENAL GLANDS:  Unremarkable   KIDNEYS/URETERS:  There is a horseshoe kidney with a relatively thin isthmic portion  There is no hydronephrosis or kidney stone identified  STOMACH AND BOWEL:  The patient's distal small bowel is thick-walled and inflamed appearing  This could indicate the presence of infectious or inflammatory enteritis or ischemic bowel  Correlate with appropriate lab work up  There does not appear to be any pneumatosis in the bowel wall  There is some equalization of enteric contents within the midportion of the small bowel which might indicate that there is some degree of bowel dysmotility or perhaps some minimal partial chronic small bowel obstruction although the small bowel loops do not seem overtly dilated at this time  There is fluid in the right side of the colon  There is diverticulosis along the left side of the colon  The stomach is unremarkable  APPENDIX:  A normal appendix was visualized  ABDOMINOPELVIC CAVITY:  There is no free air  There may be a wisp of pelvic free fluid  A couple small lymph nodes are visible in the right lower quadrant mesentery  No bulky adenopathy seen  VESSELS:  Patient is status post repair of infrarenal abdominal aortic aneurysm  No acute vascular pathology appreciated on this noncontrast exam  PELVIS REPRODUCTIVE ORGANS:  Unremarkable for patient's age  URINARY BLADDER:  Unremarkable  ABDOMINAL WALL/INGUINAL REGIONS:  There is a right inguinal hernia which contains fat and a small amount of fluid  OSSEOUS STRUCTURES:  There are couple small sclerotic densities in the right iliac bone which may be bone islands  There are no overtly destructive metastatic bone lesion seen  No fractures  Impression: Abnormal thickening and inflammation of the wall of the distal small bowel which could be infectious or inflammatory enteritis or ischemic bowel  Appropriate lab work up suggested   There is some equalization of small bowel contents proximal to this which might indicate dysmotility or a partial minimal chronic small bowel obstruction  (Bowel not abnormally dilated at this time, however) Large volume of metastatic tumor burden in the liver  Fluid in the right side of the colon suggesting diarrhea  Colonic diverticulosis also noted  Interval repair of abdominal aortic aneurysm without obvious complications  Horseshoe kidney Small right inguinal hernia containing fat and fluid Workstation performed: [id number]     Xr Chest 1 View Portable    Result Date: 7/28/2018  Narrative: CHEST INDICATION:   fever  Abdominal pain  Metastatic colon cancer  Patient on chemotherapy  COMPARISON:  6/22/2014 EXAM PERFORMED/VIEWS:  XR CHEST PORTABLE FINDINGS:  There is a right-sided infusion port catheter device present  Cardiomediastinal silhouette appears unremarkable  The lungs are clear  No pneumothorax or pleural effusion  Osseous structures appear within normal limits for patient age  Impression: No acute cardiopulmonary disease   Workstation performed: [id number]     Imaging Reports Reviewed by myself    Cultures:   Blood Culture:   Lab Results   Component Value Date    BLOODCX No Growth at 48 hrs  07/28/2018    BLOODCX No Growth at 48 hrs  07/28/2018     Urine Culture: No results found for: URINECX  Sputum Culture: No components found for: SPUTUMCX  Wound Culture: No results found for: WOUNDCULT    Last 24 Hours Medication List:     Current Facility-Administered Medications:  acetaminophen 650 mg Oral Once Olubusola O Oyesanmi, DO    acetaminophen 650 mg Oral Q6H PRN Celestine Hernandez MD    fluticasone-vilanterol 1 puff Inhalation Daily Celestine Hernandez MD    loperamide 2 mg Oral 4x Daily PRN Sania Revankar, DO    metoprolol succinate 25 mg Oral Daily Celestine Hernandez MD    morphine injection 2 mg Intravenous Q3H PRN Celestine Hernandez MD    ondansetron 8 mg Intravenous Q6H PRN Celestine Hernandez MD Last Rate: 8 mg (07/29/18 1226)   pantoprazole 40 mg Oral Early Morning Celestine Hernandez MD    sodium chloride 0 9 % with KCl 20 mEq/L 100 mL/hr Intravenous Continuous Minhui Savannah Simms MD Last Rate: 100 mL/hr (07/30/18 1223)        Today, Patient Was Seen By: Brant Fortune DO    ** Please Note: Dragon 360 Dictation voice to text software may have been used in the creation of this document   **

## 2018-07-30 NOTE — CASE MANAGEMENT
Initial Clinical Review    Admission: Date/Time/Statement: 7/28/18 @ 0744     Orders Placed This Encounter   Procedures    Inpatient Admission (expected length of stay for this patient is greater than two midnights)     Standing Status:   Standing     Number of Occurrences:   1     Order Specific Question:   Admitting Physician     Answer:   Med Alaniz     Order Specific Question:   Level of Care     Answer:   Med Surg [16]     Order Specific Question:   Estimated length of stay     Answer:   More than 2 Midnights     Order Specific Question:   Certification     Answer:   I certify that inpatient services are medically necessary for this patient for a duration of greater than two midnights  See H&P and MD Progress Notes for additional information about the patient's course of treatment  ED: Date/Time/Mode of Arrival:   ED Arrival Information     Expected Arrival Acuity Means of Arrival Escorted By Service Admission Type    - 7/28/2018 04:29 Urgent Wheelchair Family Member General Medicine Urgent    Arrival Complaint    DIARRHEA, VOMITING          Chief Complaint:   Chief Complaint   Patient presents with    Abdominal Pain     Patient has been having abdominal pain with vomiting and diarrhea since last chemo which was on Monday       History of Illness: The history is provided by the patient  Family is at bedside  74 y o  male with a history of metastatic colon cancer with liver mets, HTN, HLD, GERD, who presents with lower abd pain, N/V, and watery diarrhea for the past 3 days after recent chemotherapy with FOLFOX on 7/23/2018 at [facility] which was q2w cycle#2, his first time full-dose chemotherapy via a right upper chest infusaport  2 days after he starts to feel much worsening nausea and vomit, abd pain is more located on RLQ radiated to LLQ area, which actutally improving after bowel movement, watery clear diarrhea ~ 4 times daily since Wednesday  He saw mucus in stool but no melena or BRBPR  He denied chest pain sob hematemesis  He denied fever chill rigor but in ED, he was found 100 2F, ANC 1337, tachycardia 115, Cr = 2 95, lactic acid = 2  He states that he is unable to keep food down for a few of days  He denied cough dysuria or skin wound  ED has spoke to covering Oncologist Dr Campoverde Job contact# [phone] - recommends Zosyn until [address] >1500 (currently [phone])  Pt's most recent labs (7/23/18): WBC 9 7, Hg 9 5, Bun/Cr 27/1 6  ED Vital Signs:   ED Triage Vitals   Temperature Pulse Respirations Blood Pressure SpO2   07/28/18 0436 07/28/18 0436 07/28/18 0436 07/28/18 0436 07/28/18 0436   100 2 °F (37 9 °C) (!) 115 22 121/77 98 %      Temp Source Heart Rate Source Patient Position - Orthostatic VS BP Location FiO2 (%)   07/28/18 0436 07/28/18 0436 07/28/18 0436 07/28/18 0436 --   Tympanic Monitor Lying Right arm       Pain Score       07/28/18 0532       7        Wt Readings from Last 1 Encounters:   07/29/18 81 8 kg (180 lb 5 4 oz)       Vital Signs (abnormal): Abnormal Labs/Diagnostic Test Results:   WBC Thousand/uL 1 91*   HEMOGLOBIN g/dL 10 6*   HEMATOCRIT % 34 0*     SODIUM mmol/L 133*     CO2 mmol/L 18*   BUN mg/dL 47*   CREATININE mg/dL 2 95*     ALK PHOS U/L 187*     Ct abdomen  Abnormal thickening and inflammation of the wall of the distal small bowel which could be infectious or inflammatory enteritis or ischemic bowel  Appropriate lab work up suggested  There is some equalization of small bowel contents proximal to this which might indicate dysmotility or a partial minimal chronic small bowel obstruction  (Bowel not abnormally dilated at this time, however) Large volume of metastatic tumor burden in the liver  Fluid in the right side of the colon suggesting diarrhea  Colonic diverticulosis also noted  Interval repair of abdominal aortic aneurysm without obvious complications   Horseshoe kidney Small right inguinal hernia containing fat and fluid   Xr Chest 1 View Portable No acute cardiopulmonary disease      ED Treatment:   Medication Administration from 07/28/2018 0429 to 07/28/2018 7868       Date/Time Order Dose Route Action Action by Comments     07/28/2018 0521 ondansetron (ZOFRAN) injection 4 mg 4 mg Intravenous Given Beatrice Hayden RN      07/28/2018 0701 sodium chloride 0 9 % infusion 0 mL/hr Intravenous Stopped Hernandez Dominique, STEVE      07/28/2018 0522 sodium chloride 0 9 % infusion 1,000 mL/hr Intravenous [address] [facility]      07/28/2018 9612 acetaminophen (TYLENOL) tablet 650 mg 650 mg Oral Not Given Walter Pump, RN patient has trouble swallowing     07/28/2018 0532 morphine (PF) 4 mg/mL injection 4 mg 4 mg Intravenous Given Walter Pump, RN      07/28/2018 5000 acetaminophen (TYLENOL) oral suspension 650 mg 650 mg Oral Given Walter Pump, RN      07/28/2018 0802 sodium chloride 0 9 % bolus 1,000 mL 0 mL Intravenous Stopped Hernandez Dominique, STEVE      07/28/2018 0701 sodium chloride 0 9 % bolus 1,000 mL 1,000 mL Intravenous [address] Hernandez Dominique RN      07/28/2018 0715 cefepime (MAXIPIME) IVPB (premix) 2,000 mg 2,000 mg Intravenous Not Given Hernandez Dominique, RN      07/28/2018 0736 morphine (PF) 4 mg/mL injection 4 mg 4 mg Intravenous Given Hernandez Dominique, RN      07/28/2018 0747 piperacillin-tazobactam (ZOSYN) IVPB 3 375 g 3 375 g Intravenous New Bag Hernandez Dominique RN           Past Medical/Surgical History: Active Ambulatory Problems     Diagnosis Date Noted    No Active Ambulatory Problems     Resolved Ambulatory Problems     Diagnosis Date Noted    No Resolved Ambulatory Problems     Past Medical History:   Diagnosis Date    [facility]     [facility] cancer [facility]     Hyperlipidemia     Hypertension        Admitting Diagnosis: Enteritis [K52 9]  Abdominal pain [R10 9]  Acute renal failure (ARF) (HCC) [N17 9]  Neutropenic fever ([facility] ) [D70 9, R50 81]  Metastatic colon cancer to liver ([facility] ) [C18 9, C78 7]    Age/Sex: 74 y o  male    Assessment/Plan:   74 y o  male with a history of metastatic colon cancer with liver mets, HTN, HLD, GERD, who presents with lower abd pain, N/V, and watery diarrhea for the past 3 days after recent chemotherapy with FOLFOX on 7/23/2018 [place] [facility] [place] which was q2w cycle#2, his first time full-dose chemotherapy via a right upper chest infusaport  He was found neutropenic fever, DERREK          * Neutropenic fever ([address] )   Assessment & Plan     Recent chemotherapy with FOLFOX on 7/23/2018 at [facility] [place] which was q2w cycle#2, his first time full-dose chemotherapy via a right upper chest infusaport  Pt's most recent labs (7/23/18): WBC 9 7, Hg 9 5, Bun/Cr 27/1 6  [address] = 1338 on 7/27/2018  CXR neg  Continue Zosyn iv renal dose adjustment  F/u blood cx, stool cx, ANC  Hem/Onc consult - Dr Luis Manuel Shelton appreciated           Diarrhea in adult patient   Assessment & Plan     ? Chemo related vs gastroenteritis  Plan see above          Nausea & vomiting   Assessment & Plan     ? Chemo related vs gastroenteritis  Continue antiemetic prn          Abdominal pain   Assessment & Plan     CT abd/pelvis reviewed: Abnormal thickening and inflammation of the wall of the distal small bowel which could be infectious or inflammatory enteritis or ischemic bowel  Appropriate lab work up suggested  There is some equalization of small bowel contents proximal to this which might indicate dysmotility or a partial minimal chronic small bowel obstruction  (Bowel not abnormally dilated at this time, however)  Large volume of metastatic tumor burden in the liver  Fluid in the right side of the colon suggesting diarrhea  Colonic diverticulosis also noted  Interval repair of abdominal aortic aneurysm without obvious complications  Horseshoe kidney  Small right inguinal hernia containing fat and fluid  As per CT - ? Gastroenteritis, ? Colitis, ? Mild partial SBO  Doubt ischemia bowel given hx & clinical presentation, and lactic acid = 2    Monitor abd pain, WBC, lactic acid  F/u stool cx  Morphine 2mg iv prn  Clear liquid diet as tolerated  Consider Surgery consult if clinically deteriorating  Plan d/w pt, family           Hypomagnesemia   Assessment & Plan     Lytes repletion          DERREK (acute kidney injury) ([facility] )   Assessment & Plan     More likely pre-renal due to hypovolemic from diarrhea and poor oral intake  Continue IVF hydration  Monitor Bun/Cr          GERD (gastroesophageal reflux disease)   Assessment & Plan     Continue PPI          Other hyperlipidemia   Assessment & Plan     Hold lipitor for now          HTN (hypertension)   Assessment & Plan     Hold losartan  Continue metoprolol          Metastatic colon cancer to liver [facility])   Assessment & Plan     Pt's primary Oncologist Dr Tung Schuler contact# [phone]  Last chemo on 7/23/2018 with Fluorouracil oxaliplatin leucovorin, cycle# 2, first time full dose chemo as per pt and family, via RUL infusaport  ED has spoke to covering Oncologist Dr Rudi Hyatt contact# [phone] - recommends Zosyn until [address] >1500 (currently [phone])             DVT Prophylaxis: on SCD, OOB to chair, early ambulation  Code Status: Level 3 - DNAR and DNI  Disposition: anticipate d/c home once clinically improved  Plan d/w pt, family (son, wife) at bedside      Anticipated Length of Stay:  Patient will be admitted on an Inpatient basis with an anticipated length of stay of  > 2 midnights         Admission Orders:  GMF  CONTACT ISOLATION  NEUTROPENIC PRECAUTIONS  ASPIRATION PRECAUTIONS  URINE CS  CONSULT ONCOLOGY  DAILY WEIGHT  STOOL ENTERIC BACTERIAL PANEL  STOOL C DIFF TOXIN   IV ZOFRAN  MG PHOS CBC DIFF BMP    Scheduled Meds:   Current Facility-Administered Medications:  acetaminophen 650 mg Oral Once Olubusola O Oyesanmi, DO    acetaminophen 650 mg Oral Q6H PRN Joana Be MD    fluticasone-vilanterol 1 puff Inhalation Daily Joana Be MD    metoprolol succinate 25 mg Oral Daily Joana Be MD    morphine injection 2 mg Intravenous Q3H PRN Chantelle Ramirez MD    ondansetron 8 mg Intravenous Q6H PRN Chantelle Ramirez MD Last Rate: 8 mg (07/29/18 1226)   pantoprazole 40 mg Oral Early Morning Chantelle Ramirez MD    sodium chloride 0 9 % with KCl 20 mEq/L 100 mL/hr Intravenous Continuous Chantelle Ramirez MD Last Rate: 100 mL/hr (07/30/18 1223)     Continuous Infusions:   sodium chloride 0 9 % with KCl 20 mEq/L 100 mL/hr Last Rate: 100 mL/hr (07/30/18 1223)     PRN Meds: acetaminophen    morphine injection    ondansetron

## 2018-07-30 NOTE — PHYSICIAN ADVISOR
Current patient class: Inpatient  The patient is currently on Hospital Day: 3 at [address] [place] [address]      The patient was admitted to the hospital at [id number] on 7/28/18 for the following diagnosis:  Enteritis [K52 9]  Abdominal pain [R10 9]  Acute renal failure (ARF) ([address] ) [N17 9]  Neutropenic fever ([facility] ) [D70 9, R50 81]  Metastatic colon cancer to liver ([facility] [address] ) [C18 9, C78 7]       There is documentation in the medical record of an expected length of stay of at least 2 midnights  The patient is therefore expected to satisfy the 2 midnight benchmark and given the 2 midnight presumption is appropriate for INPATIENT ADMISSION  Given this expectation of a satisfying stay, CMS instructs us that the patient is most often appropriate for inpatient admission under part A provided medical necessity is documented in the chart  After review of the relevant documentation, labs, vital signs and test results, the patient is appropriate for INPATIENT ADMISSION  Admission to the hospital as an inpatient is a complex decision making process which requires the practitioner to consider the patients presenting complaint, history and physical examination and all relevant testing  With this in mind, in this case, the patient was deemed appropriate for INPATIENT ADMISSION  After review of the documentation and testing available at the time of the admission I concur with this clinical determination of medical necessity  78-year-old male status post recent chemotherapy admitted to the hospital with neutropenic fever and abdominal pain  He continues on IV antibiotics due to having neutropenia  Since he has been hospitalized for more than two midnights, he is appropriate for inpatient status  Rationale is as follows:     The patient is a 74 yrs old Male who presented to the ED at 7/28/2018  4:36 AM with a chief complaint of Abdominal Pain (Patient has been having abdominal pain with vomiting and diarrhea since last chemo which was on Monday)    The patients vitals on arrival were ED Triage Vitals   Temperature Pulse Respirations Blood Pressure SpO2   07/28/18 0436 07/28/18 0436 07/28/18 0436 07/28/18 0436 07/28/18 0436   100 2 °F (37 9 °C) (!) 115 22 121/77 98 %      Temp Source Heart Rate Source Patient Position - Orthostatic VS BP Location FiO2 (%)   07/28/18 0436 07/28/18 0436 07/28/18 0436 07/28/18 0436 --   Tympanic Monitor Lying Right arm       Pain Score       07/28/18 0532       7           Past Medical History:   Diagnosis Date    Cancer [facility])     Colon cancer ([facility] )     Hyperlipidemia     Hypertension      Past Surgical History:   Procedure Laterality Date    ABDOMINAL AORTIC ANEURYSM REPAIR, OPEN      ABDOMINAL HERNIA REPAIR             Consults have been placed to:   IP CONSULT TO ONCOLOGY  IP CONSULT TO CASE MANAGEMENT    Vitals:    07/29/18 1441 07/29/18 2052 07/30/18 0740 07/30/18 1404   BP: 129/77 129/81 140/81 141/88   BP Location: Left arm Left arm Left arm Left arm   Pulse: 97 94 88 95   Resp: 18 18 18 18   Temp: 97 7 °F (36 5 °C) 98 1 °F (36 7 °C) (!) 97 3 °F (36 3 °C) 98 4 °F (36 9 °C)   TempSrc: Oral Oral Tympanic Oral   SpO2: 97% 95% 97% 90%   Weight:       Height:           Most recent labs:    Recent Labs      07/28/18   0519   07/30/18   0640   WBC  1 91*   < >  3 24*   HGB  10 6*   < >  8 6*   HCT  34 0*   < >  28 7*   PLT  246   < >  169   K  3 5   < >  3 8   NA  133*   < >  143   CALCIUM  8 5   < >  8 0*   BUN  47*   < >  32*   CREATININE  2 95*   < >  2 01*   LIPASE  223   --    --    AST  34   --    --    ALT  37   --    --    ALKPHOS  187*   --    --    BILITOT  0 70   --    --     < > = values in this interval not displayed         Scheduled Meds:  Current Facility-Administered Medications:  acetaminophen 650 mg Oral Once Olubuselizabeth Sheehan, DO    acetaminophen 650 mg Oral Q6H PRN Ubaldo Luke MD    fluticasone-vilanterol 1 puff Inhalation Daily Ubaldo Luke MD    loperamide 2 mg Oral 4x Daily PRN Sania Colon DO    metoprolol succinate 25 mg Oral Daily Leyla Evans MD    morphine injection 2 mg Intravenous Q3H PRN Leyla Evans MD    ondansetron 8 mg Intravenous Q6H PRN Leyla Evans MD Last Rate: 8 mg (07/29/18 1226)   pantoprazole 40 mg Oral Early Morning Leyla Evans MD    sodium chloride 0 9 % with KCl 20 mEq/L 100 mL/hr Intravenous Continuous Leyla Evans MD Last Rate: 100 mL/hr (07/30/18 1223)     Continuous Infusions:  sodium chloride 0 9 % with KCl 20 mEq/L 100 mL/hr Last Rate: 100 mL/hr (07/30/18 1223)     PRN Meds: acetaminophen    loperamide    morphine injection    ondansetron

## 2018-07-31 ENCOUNTER — APPOINTMENT (INPATIENT)
Dept: RADIOLOGY | Facility: HOSPITAL | Age: 73
DRG: 809 | End: 2018-07-31
Payer: MEDICARE

## 2018-07-31 PROBLEM — E87.0 HYPERNATREMIA: Status: ACTIVE | Noted: 2018-07-31

## 2018-07-31 LAB
ANION GAP SERPL CALCULATED.3IONS-SCNC: 13 MMOL/L (ref 4–13)
ANION GAP SERPL CALCULATED.3IONS-SCNC: 14 MMOL/L (ref 4–13)
ANISOCYTOSIS BLD QL SMEAR: PRESENT
ARTERIAL PATENCY WRIST A: NO
BASE EXCESS BLDA CALC-SCNC: -12.8 MMOL/L
BASOPHILS # BLD MANUAL: 0 THOUSAND/UL (ref 0–0.1)
BASOPHILS NFR MAR MANUAL: 0 % (ref 0–1)
BODY TEMPERATURE: 97.5 DEGREES FEHRENHEIT
BUN SERPL-MCNC: 33 MG/DL (ref 5–25)
BUN SERPL-MCNC: 38 MG/DL (ref 5–25)
BURR CELLS BLD QL SMEAR: PRESENT
CALCIUM SERPL-MCNC: 8.6 MG/DL (ref 8.3–10.1)
CALCIUM SERPL-MCNC: 8.7 MG/DL (ref 8.3–10.1)
CHLORIDE SERPL-SCNC: 116 MMOL/L (ref 100–108)
CHLORIDE SERPL-SCNC: 118 MMOL/L (ref 100–108)
CO2 SERPL-SCNC: 16 MMOL/L (ref 21–32)
CO2 SERPL-SCNC: 16 MMOL/L (ref 21–32)
CREAT SERPL-MCNC: 1.9 MG/DL (ref 0.6–1.3)
CREAT SERPL-MCNC: 2.14 MG/DL (ref 0.6–1.3)
EOSINOPHIL # BLD MANUAL: 0.07 THOUSAND/UL (ref 0–0.4)
EOSINOPHIL NFR BLD MANUAL: 4 % (ref 0–6)
ERYTHROCYTE [DISTWIDTH] IN BLOOD BY AUTOMATED COUNT: 17.1 % (ref 11.6–15.1)
GFR SERPL CREATININE-BSD FRML MDRD: 29 ML/MIN/1.73SQ M
GFR SERPL CREATININE-BSD FRML MDRD: 34 ML/MIN/1.73SQ M
GLUCOSE SERPL-MCNC: 122 MG/DL (ref 65–140)
GLUCOSE SERPL-MCNC: 140 MG/DL (ref 65–140)
HCO3 BLDA-SCNC: 9.8 MMOL/L (ref 22–28)
HCT VFR BLD AUTO: 33.7 % (ref 36.5–49.3)
HGB BLD-MCNC: 9.9 G/DL (ref 12–17)
LYMPHOCYTES # BLD AUTO: 0.34 THOUSAND/UL (ref 0.6–4.47)
LYMPHOCYTES # BLD AUTO: 20 % (ref 14–44)
MAGNESIUM SERPL-MCNC: 1.9 MG/DL (ref 1.6–2.6)
MCH RBC QN AUTO: 26.1 PG (ref 26.8–34.3)
MCHC RBC AUTO-ENTMCNC: 29.4 G/DL (ref 31.4–37.4)
MCV RBC AUTO: 89 FL (ref 82–98)
MONOCYTES # BLD AUTO: 0.2 THOUSAND/UL (ref 0–1.22)
MONOCYTES NFR BLD: 12 % (ref 4–12)
NEUTROPHILS # BLD MANUAL: 1.09 THOUSAND/UL (ref 1.85–7.62)
NEUTS BAND NFR BLD MANUAL: 20 % (ref 0–8)
NEUTS SEG NFR BLD AUTO: 44 % (ref 43–75)
NON VENT ROOM AIR: YES %
NRBC BLD AUTO-RTO: 0 /100 WBCS
O2 CT BLDA-SCNC: 15.2 ML/DL (ref 16–23)
OVALOCYTES BLD QL SMEAR: PRESENT
OXYHGB MFR BLDA: 97.4 % (ref 94–97)
PCO2 BLDA: 16.4 MM HG (ref 36–44)
PCO2 TEMP ADJ BLDA: 16 MM HG (ref 36–44)
PH BLD: 7.4 [PH] (ref 7.35–7.45)
PH BLDA: 7.39 [PH] (ref 7.35–7.45)
PLATELET # BLD AUTO: 155 THOUSANDS/UL (ref 149–390)
PLATELET BLD QL SMEAR: ADEQUATE
PMV BLD AUTO: 8.6 FL (ref 8.9–12.7)
PO2 BLD: 103.7 MM HG (ref 75–129)
PO2 BLDA: 107.3 MM HG (ref 75–129)
POTASSIUM SERPL-SCNC: 4 MMOL/L (ref 3.5–5.3)
POTASSIUM SERPL-SCNC: 4.6 MMOL/L (ref 3.5–5.3)
RBC # BLD AUTO: 3.8 MILLION/UL (ref 3.88–5.62)
RBC MORPH BLD: PRESENT
SCHISTOCYTES BLD QL SMEAR: PRESENT
SODIUM SERPL-SCNC: 146 MMOL/L (ref 136–145)
SODIUM SERPL-SCNC: 147 MMOL/L (ref 136–145)
SPECIMEN SOURCE: ABNORMAL
TOTAL CELLS COUNTED SPEC: 50
WBC # BLD AUTO: 1.7 THOUSAND/UL (ref 4.31–10.16)

## 2018-07-31 PROCEDURE — 83735 ASSAY OF MAGNESIUM: CPT | Performed by: FAMILY MEDICINE

## 2018-07-31 PROCEDURE — 82805 BLOOD GASES W/O2 SATURATION: CPT | Performed by: FAMILY MEDICINE

## 2018-07-31 PROCEDURE — 80048 BASIC METABOLIC PNL TOTAL CA: CPT | Performed by: FAMILY MEDICINE

## 2018-07-31 PROCEDURE — 85027 COMPLETE CBC AUTOMATED: CPT | Performed by: FAMILY MEDICINE

## 2018-07-31 PROCEDURE — 99233 SBSQ HOSP IP/OBS HIGH 50: CPT | Performed by: INTERNAL MEDICINE

## 2018-07-31 PROCEDURE — 85007 BL SMEAR W/DIFF WBC COUNT: CPT | Performed by: FAMILY MEDICINE

## 2018-07-31 PROCEDURE — 99232 SBSQ HOSP IP/OBS MODERATE 35: CPT | Performed by: FAMILY MEDICINE

## 2018-07-31 PROCEDURE — 36415 COLL VENOUS BLD VENIPUNCTURE: CPT | Performed by: FAMILY MEDICINE

## 2018-07-31 PROCEDURE — 70450 CT HEAD/BRAIN W/O DYE: CPT

## 2018-07-31 RX ORDER — LOPERAMIDE HYDROCHLORIDE 2 MG/1
2 CAPSULE ORAL EVERY 2 HOUR PRN
Status: DISCONTINUED | OUTPATIENT
Start: 2018-07-31 | End: 2018-07-31

## 2018-07-31 RX ORDER — SODIUM CHLORIDE 450 MG/100ML
100 INJECTION, SOLUTION INTRAVENOUS CONTINUOUS
Status: DISCONTINUED | OUTPATIENT
Start: 2018-07-31 | End: 2018-08-01

## 2018-07-31 RX ORDER — DIPHENOXYLATE HYDROCHLORIDE AND ATROPINE SULFATE 2.5; .025 MG/1; MG/1
1 TABLET ORAL 4 TIMES DAILY
Status: DISCONTINUED | OUTPATIENT
Start: 2018-07-31 | End: 2018-08-01 | Stop reason: HOSPADM

## 2018-07-31 RX ADMIN — FLUTICASONE FUROATE AND VILANTEROL TRIFENATATE 1 PUFF: 100; 25 POWDER RESPIRATORY (INHALATION) at 16:59

## 2018-07-31 RX ADMIN — SODIUM CHLORIDE 100 ML/HR: 0.45 INJECTION, SOLUTION INTRAVENOUS at 14:42

## 2018-07-31 RX ADMIN — PANTOPRAZOLE SODIUM 40 MG: 40 TABLET, DELAYED RELEASE ORAL at 05:18

## 2018-07-31 RX ADMIN — METOPROLOL SUCCINATE 25 MG: 25 TABLET, FILM COATED, EXTENDED RELEASE ORAL at 09:23

## 2018-07-31 RX ADMIN — DIPHENOXYLATE HYDROCHLORIDE AND ATROPINE SULFATE 1 TABLET: 2.5; .025 TABLET ORAL at 21:34

## 2018-07-31 RX ADMIN — SODIUM CHLORIDE AND POTASSIUM CHLORIDE 100 ML/HR: .9; .15 SOLUTION INTRAVENOUS at 08:00

## 2018-07-31 RX ADMIN — LOPERAMIDE HYDROCHLORIDE 2 MG: 2 CAPSULE ORAL at 12:05

## 2018-07-31 RX ADMIN — TBO-FILGRASTIM 480 MCG: 480 INJECTION, SOLUTION SUBCUTANEOUS at 14:42

## 2018-07-31 RX ADMIN — ONDANSETRON 8 MG: 2 INJECTION INTRAMUSCULAR; INTRAVENOUS at 09:07

## 2018-07-31 RX ADMIN — LOPERAMIDE HYDROCHLORIDE 2 MG: 2 CAPSULE ORAL at 05:18

## 2018-07-31 NOTE — PROGRESS NOTES
Hematology - Oncology Progress Note    Vini De La Fuente, 1945, [id number]  [address]      Impression and plan:    75-year-old male with recent diagnosis of metastatic M1 colon cancer (liver) presently on FOLFOX chemotherapy  Issues:     1  Neutropenic fevers  Fevers have abated, patient feels much better  Antibiotics have been discontinued  Patient received 2 days of G-CSF, white count improved  The white count has dropped back down but patient is not symptomatic (it was Mr Katie Blackburn request a hold the G-CSF after the 1st 2 doses)  We discussed options  Patient is to take 1 additional injection of G-CSF now      2  GI issues - better yesterday, worse today  Patient's abdominal pain is much less/better than before but patient has had some issues with nausea today  Surveillance is ongoing  Patient also had some diarrhea this morning  C diff titers were negative  As soon as patient has GI issues have resolved, the plan is for discharge      3  Colon cancer with liver metastases  Chemotherapy is obviously on hold until patient is better and cleared of his infection  Patient and primary oncologist will eventually need to reassess the present regimen (dose reduction/manipulation etc)      4  Question of Avastin  Commonly, bevacizumab is given with FOLFOX upfront to patients with metastatic colon cancer  I asked Mr Katie Blackburn about this specifically  Patient is not sure if he is on Avastin - patient will discuss this with his primary oncologist   As discussed previously, there is a concern for surgical procedures specifically on the bowel in any patient receiving bevacizumab within the proceeding month        [email or web address] ca  [email or web address]       5  Elevated BUN and creatinine - continues to improve  Patient is on IV fluids  Trend is being monitored    ____________________________________________________________________________________________________    Chief complaint:  Abdominal pain, decreased appetite, nausea - now better    History of present illness: 64-year-old male admitted with the above  Mr  Maggie Silva was recently diagnosed with metastatic (liver) cecal/colon adenocarcinoma and recently started chemotherapy  The 1st cycle was actually broken up into different pieces; patient only recently received his 1st complete regular cycle of FOLFOX  Patient began to have nausea, severe diarrhea and abdominal pain prompting an ER visit  CT of the abdomen and pelvis demonstrated "abnormal thickening and inflammation of the wall of the distal small bowel which could be infectious or inflammatory enteritis or ischemic bowel"  Patient has been admitted for pain control, rehydration and workup and treatment of the diarrhea      Presently patient states feeling +/-  Patient had nausea this morning and some diarrhea  No blood in the stools  Appetite is decreased  No abdominal pain  No fevers      Hospital medications list:    Current Facility-Administered Medications:  acetaminophen 650 mg Oral Once Olubusola O Oyesanmi, DO    acetaminophen 650 mg Oral Q6H PRN Xu Hollis MD    fluticasone-vilanterol 1 puff Inhalation Daily Xu Hollis MD    loperamide 2 mg Oral 4x Daily PRN Sania Revankar, DO    metoprolol succinate 25 mg Oral Daily Xu Hollis MD    morphine injection 2 mg Intravenous Q3H PRN Xu Hollis MD    ondansetron 8 mg Intravenous Q6H PRN Xu Hollis MD Last Rate: 8 mg (07/29/18 1226)   pantoprazole 40 mg Oral Early Morning Xu Hollis MD    sodium chloride 0 9 % with KCl 20 mEq/L 100 mL/hr Intravenous Continuous Xu Hollis MD Last Rate: 100 mL/hr (07/30/18 2153)     Physical exam  /90 (BP Location: Left arm)   Pulse 95   Temp 97 8 °F (36 6 °C) (Oral)   Resp 18   Ht 5' 10" (1 778 m)   Wt 81 6 kg (180 lb)   SpO2 98%   BMI 25 83 kg/m²   Constitutional: Well formed, well-nourished  in no apparent distress  Well-nourished male, no acute distress   HENT:   Head: Normocephalic and atraumatic  Right Ear: External ear normal    Left Ear: External ear normal    Nose: Nose normal    Mouth/Throat: Oropharynx is clear and moist    Eyes: Conjunctivae and EOM are normal  Pupils are equal, round, and reactive to light  Neck: Normal range of motion  Neck supple  Cardiovascular: Normal rate, regular rhythm, normal heart sounds and intact distal pulses  Pulmonary/Chest: Effort normal and breath sounds normal    Abdominal: Soft  Bowel sounds are normal    +bowel sounds, nontender, no rigidity or rebound  Musculoskeletal: Normal range of motion  Neurological: He is alert and oriented to person, place, and time  He has normal reflexes  Skin: Skin is warm  Right anterior chest wall port placed recently with he well healing suture line   Psychiatric: He has a normal mood and affect  His behavior is normal  Judgment and thought content normal    Extremities:  No edema, no cords, pulses are 1+  Lymphatics:  No adenopathy in the neck, supraclavicular region, axilla and groin bilaterally    Laboratory test results    Results for [facility] (N [id number]) as of 7/30/2018 17:11   Ref  Range 7/29/2018 06:31   C DIFF TOXIN B Latest Ref Range: NEGATIVE for C difficle toxin by PCR  NEGATIVE for C di    Results for [facility] (N [id number]) as of 7/31/2018 07:57   Ref  Range 7/28/2018 05:19 7/28/2018 05:27 7/28/2018 14:07 7/29/2018 06:07 7/29/2018 07:58 7/30/2018 06:40 7/31/2018 05:29   BUN Latest Ref Range: 5 - 25 mg/dL 47 (H)   39 (H)  32 (H) 33 (H)   Creatinine Latest Ref Range: 0 60 - 1 30 mg/dL 2 95 (H)   2 32 (H)  2 01 (H) 1 90 (H)       Results for [facility] (MRN [id number]) as of 7/31/2018 07:57   Ref   Range 7/28/2018 05:19 7/29/2018 06:07 7/30/2018 06:40 7/31/2018 05:29   WBC Latest Ref Range: 4 31 - 10 16 Thousand/uL 1 91 (LL) 2 11 (L) 3 24 (L) 1 70 (LL)   RBC Latest Ref Range: 3 88 - 5 62 Million/uL 4 08 3 32 (L) 3 26 (L) 3 80 (L)   Hemoglobin Latest Ref Range: 12 0 - 17 0 g/dL 10 6 (L) 8 7 (L) 8 6 (L) 9 9 (L)   Hematocrit Latest Ref Range: 36 5 - 49 3 % 34 0 (L) 29 1 (L) 28 7 (L) 33 7 (L)   MCV Latest Ref Range: 82 - 98 fL 83 88 88 89   MCH Latest Ref Range: 26 8 - 34 3 pg 26 0 (L) 26 2 (L) 26 4 (L) 26 1 (L)   MCHC Latest Ref Range: 31 4 - 37 4 g/dL 31 2 (L) 29 9 (L) 30 0 (L) 29 4 (L)   RDW Latest Ref Range: 11 6 - 15 1 % 16 6 (H) 16 6 (H) 16 6 (H) 17 1 (H)   Platelets Latest Ref Range: 149 - 390 Thousands/uL 246 187 169 155       Imaging     07/28/2018 CT scan of the abdomen pelvis     Abnormal thickening and inflammation of the wall of the distal small bowel which could be infectious or inflammatory enteritis or ischemic bowel   Appropriate lab work up suggested  There is some equalization of small bowel contents proximal to this which might indicate dysmotility or a partial minimal chronic small bowel obstruction   (Bowel not abnormally dilated at this time, however)  Large volume of metastatic tumor burden in the liver  Fluid in the right side of the colon suggesting diarrhea   Colonic diverticulosis also noted  Interval repair of abdominal aortic aneurysm without obvious complications    Horseshoe kidney   Small right inguinal hernia containing fat and fluid     Pathology     No pathology results are available for review

## 2018-07-31 NOTE — PROGRESS NOTES
[facility] Progress Note  Patient: Allan Desouza 74 y o  male   MRN: [id number]  PCP: Oly Kidd MD  Unit/Bed#: [address] [place] [address] Encounter: [id number]  Date Of Visit: 07/31/18    Problem List:    Principal Problem:    Neutropenic fever ([facility] )  Active Problems:    DERREK (acute kidney injury) ([facility] )    Diarrhea in adult patient    Abdominal pain    Metastatic colon cancer to liver ([facility] )    Nausea & vomiting    Hypernatremia    Hypomagnesemia    Essential hypertension    Other hyperlipidemia    GERD (gastroesophageal reflux disease)      Assessment & Plan:    * Neutropenic fever ([facility] )   Assessment & Plan    Recent chemotherapy with FOLFOX on 7/23/2018 at [facility] which was q2w cycle#2, his first time full-dose chemotherapy via a right upper chest infusaport  CXR neg, UA not convincing for UTI  Blood cultures have been negative as well  ANC has now improved and Zosyn discontinued  ED had talked with covering Oncologist Dr Roddy Cranker contact # [phone] who recommended Zosyn until [address] >1500  Hem/Onc input appreciated  White cell count lower today  received a dose of granix  Status post Granix x 2 doses previously        Diarrhea in adult patient   Assessment & Plan    Likely chemo related vs gastroenteritis worsened from antibiotic use  C  diff has been negative  Although diarrhea did improve with Imodium, patient continued to have diarrhea and medication changed to Lomotil        DERREK (acute kidney injury) ([facility] )   Assessment & Plan    More likely pre-renal due to from diarrhea and poor oral intake  Creatinine improving with IVF hydration, although not back at baseline  Repeat lab work in the a m        Hypernatremia   Assessment & Plan    IV fluids changed to half-normal saline    Monitor        Nausea & vomiting   Assessment & Plan    Possibly Chemo related vs gastroenteritis  Patient did report vomiting today        Metastatic colon cancer to liver [facility])   Assessment & Plan    Pt's primary Oncologist  Federico Hudson contact # [phone]  Last chemo on 7/23/2018 with Fluorouracil oxaliplatin leucovorin, cycle# 2, first time full dose chemo as per pt and family, via RUL infusaport  ED had talked with covering Oncologist Dr Candelario Castellanos contact # [phone] who recommended Zosyn until [address] >1500        Abdominal pain   Assessment & Plan    Now resolved  CT abd/pelvis reviewed: Abnormal thickening and inflammation of the wall of the distal small bowel which could be infectious or inflammatory enteritis or ischemic bowel  Possible dysmotility or a partial minimal chronic small bowel obstruction  Large volume of metastatic tumor burden in the liver  diarrhea noted  Interval repair of abdominal aortic aneurysm without obvious complications  Horseshoe kidney  Small right inguinal hernia containing fat and fluid  Doubt ischemic bowel given hx & clinical presentation, lactic acid normal        GERD (gastroesophageal reflux disease)   Assessment & Plan    Continue Protonix  Other hyperlipidemia   Assessment & Plan    Holding lipitor for now  Essential hypertension   Assessment & Plan    Holding losartan  Continue Toprol-XL        Hypomagnesemia   Assessment & Plan    Improved with repletion  VTE Pharmacologic Prophylaxis:   Pharmacologic: Pharmacologic VTE Prophylaxis contraindicated due to anemia, patient ambulatory  Mechanical VTE Prophylaxis in Place: Yes    Patient Centered Rounds: I have performed bedside rounds with nursing staff today  Discussions with Specialists or Other Care Team Provider: Yes    Education and Discussions with Family / Patient:Yes    Time Spent for Care: 40 min  More than 50% of total time spent on counseling and coordination of care as described above      Current Length of Stay: 3 day(s)    Current Patient Status: Inpatient     Discharge Plan: home    Code Status: Level 3 - DNAR and DNI    Certification Statement: The patient will continue to require additional inpatient hospital stay due to Persistent diarrhea, acute kidney injury      Subjective:     Reported improvement in his diarrhea  Decreased p o  intake    Objective:     Vitals:   Temp (24hrs), Av 7 °F (36 5 °C), Min:97 5 °F (36 4 °C), Max:97 8 °F (36 6 °C)    HR:  [] 110  Resp:  [18] 18  BP: (142-161)/(90-94) 161/94  SpO2:  [97 %-98 %] 97 %  Body mass index is 25 83 kg/m²  Input and Output Summary (last 24 hours): Intake/Output Summary (Last 24 hours) at 18 1920  Last data filed at 18 0901   Gross per 24 hour   Intake             1120 ml   Output                0 ml   Net             1120 ml       Physical Exam:     Physical Exam   Constitutional: He is oriented to person, place, and time  He appears well-developed and well-nourished  No distress  HENT:   Head: Normocephalic and atraumatic  Mouth/Throat: Oropharynx is clear and moist    Eyes: Conjunctivae are normal  Right eye exhibits no discharge  Left eye exhibits no discharge  No scleral icterus  Neck: Neck supple  No tracheal deviation present  Cardiovascular: Normal rate and regular rhythm  Pulmonary/Chest: Effort normal and breath sounds normal  No respiratory distress  He has no wheezes  He has no rales  Abdominal: Soft  Bowel sounds are normal  He exhibits no distension  There is no tenderness  Musculoskeletal: He exhibits no edema  Neurological: He is alert and oriented to person, place, and time  No cranial nerve deficit  Skin: He is not diaphoretic  Psychiatric: He has a normal mood and affect         Additional Data:     Labs:      Results from last 7 days  Lab Units 18  0529   WBC Thousand/uL 1 70*   HEMOGLOBIN g/dL 9 9*   HEMATOCRIT % 33 7*   PLATELETS Thousands/uL 155   LYMPHO PCT % 20   MONO PCT MAN % 12   EOSINO PCT MANUAL % 4       Results from last 7 days  Lab Units 18  0529  18  0519   SODIUM mmol/L 146*  < > 133*   POTASSIUM mmol/L 4 0  < > 3 5   CHLORIDE mmol/L 116*  < > 101   CO2 mmol/L 16*  < > 18*   BUN mg/dL 33*  < > 47*   CREATININE mg/dL 1 90*  < > 2 95*   CALCIUM mg/dL 8 6  < > 8 5   TOTAL PROTEIN g/dL  --   --  6 4   BILIRUBIN TOTAL mg/dL  --   --  0 70   ALK PHOS U/L  --   --  187*   ALT U/L  --   --  37   AST U/L  --   --  34   GLUCOSE RANDOM mg/dL 122  < > 132   < > = values in this interval not displayed  * I Have Reviewed All Lab Data Listed Above  * Additional Pertinent Lab Tests Reviewed: All Labs For Current Hospital Admission Reviewed    Imaging:  Ct Abdomen Pelvis Wo Contrast    Result Date: 7/28/2018  Narrative: CT ABDOMEN AND PELVIS WITHOUT IV CONTRAST INDICATION:   abd pain  Vomiting and diarrhea since last chemotherapy session which was on Monday  History of colon cancer  COMPARISON:  6/9/2014 TECHNIQUE:  CT examination of the abdomen and pelvis was performed without intravenous contrast   Axial, sagittal, and coronal 2D reformatted images were created from the source data and submitted for interpretation  Radiation dose length product (DLP) for this visit:  615 68 mGy-cm   This examination, like all CT scans performed in the [facility], was performed utilizing techniques to minimize radiation dose exposure, including the use of iterative  reconstruction and automated exposure control  Enteric contrast was not administered  FINDINGS: ABDOMEN LOWER CHEST:  Minimal dependent atelectasis is noted in the lung bases  There are some coronary artery calcification  LIVER/BILIARY TREE:  There is extensive hepatic metastatic disease throughout the liver with numerous large masses seen in the left and right hepatic lobes  These are new since the previous CT exam   The dominant left lobe mass measures 11 3 cm diameter  Masses in the posterior aspect of the right liver measure up to about 10 cm and a more inferiorly located dominant mass in the lower portion of the right hepatic lobe measures 11 4 cm  No gross evidence of biliary obstruction  GALLBLADDER:  Gallbladder is somewhat distended but does not appear inflamed  There might be some sludge in the lumen  It appears to have some layering density dependently  SPLEEN:  Unremarkable  PANCREAS:  Unremarkable  ADRENAL GLANDS:  Unremarkable  KIDNEYS/URETERS:  There is a horseshoe kidney with a relatively thin isthmic portion  There is no hydronephrosis or kidney stone identified  STOMACH AND BOWEL:  The patient's distal small bowel is thick-walled and inflamed appearing  This could indicate the presence of infectious or inflammatory enteritis or ischemic bowel  Correlate with appropriate lab work up  There does not appear to be any pneumatosis in the bowel wall  There is some equalization of enteric contents within the midportion of the small bowel which might indicate that there is some degree of bowel dysmotility or perhaps some minimal partial chronic small bowel obstruction although the small bowel loops do not seem overtly dilated at this time  There is fluid in the right side of the colon  There is diverticulosis along the left side of the colon  The stomach is unremarkable  APPENDIX:  A normal appendix was visualized  ABDOMINOPELVIC CAVITY:  There is no free air  There may be a wisp of pelvic free fluid  A couple small lymph nodes are visible in the right lower quadrant mesentery  No bulky adenopathy seen  VESSELS:  Patient is status post repair of infrarenal abdominal aortic aneurysm  No acute vascular pathology appreciated on this noncontrast exam  PELVIS REPRODUCTIVE ORGANS:  Unremarkable for patient's age  URINARY BLADDER:  Unremarkable  ABDOMINAL WALL/INGUINAL REGIONS:  There is a right inguinal hernia which contains fat and a small amount of fluid  OSSEOUS STRUCTURES:  There are couple small sclerotic densities in the right iliac bone which may be bone islands  There are no overtly destructive metastatic bone lesion seen  No fractures       Impression: Abnormal thickening and inflammation of the wall of the distal small bowel which could be infectious or inflammatory enteritis or ischemic bowel  Appropriate lab work up suggested  There is some equalization of small bowel contents proximal to this which might indicate dysmotility or a partial minimal chronic small bowel obstruction  (Bowel not abnormally dilated at this time, however) Large volume of metastatic tumor burden in the liver  Fluid in the right side of the colon suggesting diarrhea  Colonic diverticulosis also noted  Interval repair of abdominal aortic aneurysm without obvious complications  Horseshoe kidney Small right inguinal hernia containing fat and fluid Workstation performed: [id number]     Xr Chest 1 View Portable    Result Date: 7/28/2018  Narrative: CHEST INDICATION:   fever  Abdominal pain  Metastatic colon cancer  Patient on chemotherapy  COMPARISON:  6/22/2014 EXAM PERFORMED/VIEWS:  XR CHEST PORTABLE FINDINGS:  There is a right-sided infusion port catheter device present  Cardiomediastinal silhouette appears unremarkable  The lungs are clear  No pneumothorax or pleural effusion  Osseous structures appear within normal limits for patient age  Impression: No acute cardiopulmonary disease   Workstation performed: [id number]     Imaging Reports Reviewed by myself    Cultures:   Blood Culture:   Lab Results   Component Value Date    BLOODCX No Growth at 72 hrs  07/28/2018    BLOODCX No Growth at 72 hrs  07/28/2018     Urine Culture: No results found for: URINECX  Sputum Culture: No components found for: SPUTUMCX  Wound Culture: No results found for: WOUNDCULT    Last 24 Hours Medication List:     Current Facility-Administered Medications:  acetaminophen 650 mg Oral Once Olubusola O Oyesanmi, DO    acetaminophen 650 mg Oral Q6H PRN Suyapa De La Rosa MD    diphenoxylate-atropine 1 tablet Oral 4x Daily Sania Revankar, DO    fluticasone-vilanterol 1 puff Inhalation Daily Suyapa De La Rosa MD    metoprolol succinate 25 mg Oral Daily Justyna Osman MD    morphine injection 2 mg Intravenous Q3H PRN Justyna Osman MD    ondansetron 8 mg Intravenous Q6H PRN Justyna Osman MD Last Rate: 8 mg (07/31/18 0907)   pantoprazole 40 mg Oral Early Morning Justyna Osman MD    sodium chloride 100 mL/hr Intravenous Continuous Sania Colon DO Last Rate: 100 mL/hr (07/31/18 1442)        Today, Patient Was Seen By: Jeremy Anderson DO    ** Please Note: Dragon 360 Dictation voice to text software may have been used in the creation of this document   **

## 2018-08-01 VITALS
DIASTOLIC BLOOD PRESSURE: 73 MMHG | BODY MASS INDEX: 25 KG/M2 | RESPIRATION RATE: 18 BRPM | TEMPERATURE: 97.8 F | HEIGHT: 70 IN | HEART RATE: 122 BPM | WEIGHT: 174.6 LBS | SYSTOLIC BLOOD PRESSURE: 110 MMHG | OXYGEN SATURATION: 97 %

## 2018-08-01 PROBLEM — N18.9 ACUTE KIDNEY INJURY SUPERIMPOSED ON CHRONIC KIDNEY DISEASE (HCC): Status: ACTIVE | Noted: 2018-07-28

## 2018-08-01 PROBLEM — E87.2 METABOLIC ACIDOSIS: Status: ACTIVE | Noted: 2018-08-01

## 2018-08-01 PROBLEM — N18.30 CKD (CHRONIC KIDNEY DISEASE) STAGE 3, GFR 30-59 ML/MIN (HCC): Status: ACTIVE | Noted: 2018-08-01

## 2018-08-01 LAB
ANION GAP SERPL CALCULATED.3IONS-SCNC: 16 MMOL/L (ref 4–13)
BUN SERPL-MCNC: 40 MG/DL (ref 5–25)
CALCIUM SERPL-MCNC: 8.5 MG/DL (ref 8.3–10.1)
CHLORIDE SERPL-SCNC: 116 MMOL/L (ref 100–108)
CO2 SERPL-SCNC: 14 MMOL/L (ref 21–32)
CREAT SERPL-MCNC: 2.35 MG/DL (ref 0.6–1.3)
ERYTHROCYTE [DISTWIDTH] IN BLOOD BY AUTOMATED COUNT: 17.5 % (ref 11.6–15.1)
ERYTHROCYTE [DISTWIDTH] IN BLOOD BY AUTOMATED COUNT: 17.5 % (ref 11.6–15.1)
GFR SERPL CREATININE-BSD FRML MDRD: 26 ML/MIN/1.73SQ M
GLUCOSE SERPL-MCNC: 115 MG/DL (ref 65–140)
HCT VFR BLD AUTO: 34.8 % (ref 36.5–49.3)
HCT VFR BLD AUTO: 34.8 % (ref 36.5–49.3)
HGB BLD-MCNC: 10.5 G/DL (ref 12–17)
HGB BLD-MCNC: 10.5 G/DL (ref 12–17)
MCH RBC QN AUTO: 26.1 PG (ref 26.8–34.3)
MCH RBC QN AUTO: 26.1 PG (ref 26.8–34.3)
MCHC RBC AUTO-ENTMCNC: 30.2 G/DL (ref 31.4–37.4)
MCHC RBC AUTO-ENTMCNC: 30.2 G/DL (ref 31.4–37.4)
MCV RBC AUTO: 87 FL (ref 82–98)
MCV RBC AUTO: 87 FL (ref 82–98)
NRBC BLD AUTO-RTO: 0 /100 WBCS
PLATELET # BLD AUTO: 167 THOUSANDS/UL (ref 149–390)
PLATELET # BLD AUTO: 167 THOUSANDS/UL (ref 149–390)
PMV BLD AUTO: 9.3 FL (ref 8.9–12.7)
PMV BLD AUTO: 9.3 FL (ref 8.9–12.7)
POTASSIUM SERPL-SCNC: 4.2 MMOL/L (ref 3.5–5.3)
RBC # BLD AUTO: 4.02 MILLION/UL (ref 3.88–5.62)
RBC # BLD AUTO: 4.02 MILLION/UL (ref 3.88–5.62)
SODIUM SERPL-SCNC: 146 MMOL/L (ref 136–145)
WBC # BLD AUTO: 1.72 THOUSAND/UL (ref 4.31–10.16)
WBC # BLD AUTO: 1.72 THOUSAND/UL (ref 4.31–10.16)

## 2018-08-01 PROCEDURE — 99222 1ST HOSP IP/OBS MODERATE 55: CPT | Performed by: INTERNAL MEDICINE

## 2018-08-01 PROCEDURE — 36600 WITHDRAWAL OF ARTERIAL BLOOD: CPT

## 2018-08-01 PROCEDURE — 85025 COMPLETE CBC W/AUTO DIFF WBC: CPT | Performed by: FAMILY MEDICINE

## 2018-08-01 PROCEDURE — 99233 SBSQ HOSP IP/OBS HIGH 50: CPT | Performed by: INTERNAL MEDICINE

## 2018-08-01 PROCEDURE — 99239 HOSP IP/OBS DSCHRG MGMT >30: CPT | Performed by: FAMILY MEDICINE

## 2018-08-01 PROCEDURE — 80048 BASIC METABOLIC PNL TOTAL CA: CPT | Performed by: FAMILY MEDICINE

## 2018-08-01 RX ORDER — ONDANSETRON 8 MG/1
8 TABLET, ORALLY DISINTEGRATING ORAL EVERY 8 HOURS PRN
Qty: 30 TABLET | Refills: 0 | Status: SHIPPED | OUTPATIENT
Start: 2018-08-01

## 2018-08-01 RX ORDER — DIPHENOXYLATE HYDROCHLORIDE AND ATROPINE SULFATE 2.5; .025 MG/1; MG/1
1 TABLET ORAL 4 TIMES DAILY
Qty: 40 TABLET | Refills: 0 | Status: SHIPPED | OUTPATIENT
Start: 2018-08-01 | End: 2018-08-11

## 2018-08-01 RX ORDER — LOSARTAN POTASSIUM 100 MG/1
100 TABLET ORAL DAILY
Refills: 0
Start: 2018-08-05

## 2018-08-01 RX ADMIN — DIPHENOXYLATE HYDROCHLORIDE AND ATROPINE SULFATE 1 TABLET: 2.5; .025 TABLET ORAL at 17:54

## 2018-08-01 RX ADMIN — METOPROLOL SUCCINATE 25 MG: 25 TABLET, FILM COATED, EXTENDED RELEASE ORAL at 09:01

## 2018-08-01 RX ADMIN — ONDANSETRON 8 MG: 2 INJECTION INTRAMUSCULAR; INTRAVENOUS at 19:59

## 2018-08-01 RX ADMIN — SODIUM BICARBONATE 100 ML/HR: 84 INJECTION, SOLUTION INTRAVENOUS at 11:34

## 2018-08-01 RX ADMIN — ONDANSETRON 8 MG: 2 INJECTION INTRAMUSCULAR; INTRAVENOUS at 13:30

## 2018-08-01 RX ADMIN — PANTOPRAZOLE SODIUM 40 MG: 40 TABLET, DELAYED RELEASE ORAL at 05:58

## 2018-08-01 RX ADMIN — DIPHENOXYLATE HYDROCHLORIDE AND ATROPINE SULFATE 1 TABLET: 2.5; .025 TABLET ORAL at 07:03

## 2018-08-01 RX ADMIN — SODIUM CHLORIDE 100 ML/HR: 0.45 INJECTION, SOLUTION INTRAVENOUS at 03:00

## 2018-08-01 RX ADMIN — DIPHENOXYLATE HYDROCHLORIDE AND ATROPINE SULFATE 1 TABLET: 2.5; .025 TABLET ORAL at 11:03

## 2018-08-01 RX ADMIN — ONDANSETRON 8 MG: 2 INJECTION INTRAMUSCULAR; INTRAVENOUS at 06:20

## 2018-08-01 RX ADMIN — FLUTICASONE FUROATE AND VILANTEROL TRIFENATATE 1 PUFF: 100; 25 POWDER RESPIRATORY (INHALATION) at 17:54

## 2018-08-01 NOTE — CASE MANAGEMENT
Continued Stay Review    Date:     Vital Signs: /88 (BP Location: Left arm)   Pulse (!) 108   Temp 97 9 °F (36 6 °C) (Axillary)   Resp 18   Ht 5' 10" (1 778 m)   Wt 79 2 kg (174 lb 9 7 oz)   SpO2 98%   BMI 25 05 kg/m²     Medications:   Scheduled Meds:   Current Facility-Administered Medications:  acetaminophen 650 mg Oral Once Olubusola O Oyesanmi, DO    acetaminophen 650 mg Oral Q6H PRN Minerva Pelaez MD    diphenoxylate-atropine 1 tablet Oral 4x Daily Sania Revankar, DO    fluticasone-vilanterol 1 puff Inhalation Daily Minerva Pelaez MD    metoprolol succinate 25 mg Oral Daily Minerva Pelaez MD    morphine injection 2 mg Intravenous Q3H PRN Minerva Pelaez MD    ondansetron 8 mg Intravenous Q6H PRN Minerva Pelaez MD Last Rate: 8 mg (08/01/18 1330)   pantoprazole 40 mg Oral Early Morning Minerva Pelaez MD    sodium bicarbonate infusion 100 mL/hr Intravenous Continuous Shyrl Bunkers, DO Last Rate: 100 mL/hr (08/01/18 1134)     Continuous Infusions:   sodium bicarbonate infusion 100 mL/hr Last Rate: 100 mL/hr (08/01/18 1134)     PRN Meds: acetaminophen    morphine injection    ondansetron    Abnormal Labs/Diagnostic Results:     Age/Sex: 74 y o  male     Nephrology  Assessment:  1  Acute renal failure/acute kidney injury on stage 3 chronic kidney disease  With a baseline creatinine of 1 9-2 01, patient has stage III borderline stage 4 chronic kidney disease with an estimated GFR between 25-30  Patient on CT scan without contrast does not show any evidence of hydronephrosis but 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  The patient has a benign urinalysis negative blood trace protein which argues against an acute glomerular process  Likely the mild increase in 2 3 is secondary to increase in diarrheal output over the past 24 hours although again significantly improved compared to baseline    Continue with IV fluids to include bicarbonate as noted below also check bladder scan PVR       In December 2014 -2015 the patient has had a creatinine of 1 4-1 5  He has likely had progression since that time  Diagnostic considerations include prior bouts of acute tubular necrosis, medication effect/chemotherapy effect      2   Metabolic acidosis:  Patient has any anion gap of 13 not correcting for albumin which is 2 5 the patient has a high anion gap metabolic acidosis likely secondary to diarrhea  Lactic acid level seemed to resolve from admission  Change IV fluids to include bicarbonate      3  Hypernatremia: na is 146, using dilute hypotonic bicarbonate solution     ATTENDING NOTE 7/31/18  Neutropenic fever   Zosyn until [address] >1500  Hem/Onc input appreciated  White cell count lower today  received a dose of granix  Status post Granix x 2 doses previously  Nausea & vomiting   Assessment & Plan     Possibly Chemo related vs gastroenteritis  Patient did report vomiting today          Metastatic colon cancer to liver [facility])   Assessment & Plan     Pt's primary Oncologist Dr Maranda Galloway contact # [phone]  Last chemo on 7/23/2018 with Fluorouracil oxaliplatin leucovorin, cycle# 2, first time full dose chemo as per pt and family, via RUL infusaport    ED had talked with covering Oncologist Dr Olivier James contact # [phone] who recommended Zosyn until ANC >1500

## 2018-08-01 NOTE — CONSULTS
Consultation - Nephrology   Allan Desouza 74 y o  male MRN: [id number]  Unit/Bed#: [address] 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  The patient has a benign urinalysis negative blood trace protein which argues against an acute glomerular process  Likely the mild increase in 2 3 is secondary to increase in diarrheal output over the past 24 hours although again significantly improved compared to baseline  Continue with IV fluids to include bicarbonate as noted below also check bladder scan PVR  In December 2014 -2015 the patient has had a creatinine of 1 4-1 5  He has likely had progression since that time  Diagnostic considerations include prior bouts of acute tubular necrosis, medication effect/chemotherapy effect  2   Metabolic acidosis:  Patient has any anion gap of 13 not correcting for albumin which is 2 5 the patient has a high anion gap metabolic acidosis likely secondary to diarrhea  Lactic acid level seemed to resolve from admission  Change IV fluids to include bicarbonate  3  Hypernatremia: na is 146, using dilute hypotonic bicarbonate solution    Plan:  As above, all questions answered when I spoke with patient and son at bedside      History of Present Illness   Physician Requesting Consult: Lidia Hendricks DO  Reason for Consult / Principal Problem:  Acute kidney injury on chronic kidney disease  Hx and PE limited by:   HPI: Allan Desouza is a 74y o  year old male who presents with diarrhea  The patient has a history of metastatic colon cancer    He lives in [place] but is coming this area to get his cancer treatments  The patient presented with increased diarrhea after receiving chemotherapy on July 23rd  At the time of admission his creatinine was 2 9  He was given intravenous fluids and his creatinine decreased to 2 0 it had increased to 2 3 are asked to see the patient for acute kidney injury on chronic kidney disease  With regards to prior history, he had been seen by Dr Baron Jensen in 2014 2015 prior to moving to [place]  He was seen after undergoing treatment for aortic dissection and he was seen in the office for blood pressure management as per the patient  I was unable to bring up old records/prior notes from that year  It seems that his creatinine at that time was 1 4-1 5  In reviewing labs from [place] as the patient's son was in the room and had access to the lab work from [facility], in May 2018 his creatinine was 2 08 in June 2018 was 1 9  The patient's diarrhea had been improving although over the past 24 hours he has had 3 episodes of diarrhea while more than the past couple of days significantly less than on admission  Patient overall feels better  He does feel wiped out but certainly better than admission  Patient's son was present at bedside  I saw the patient in the patient's room with the hospitalist Berhane Walls           Inpatient consult to Nephrology  Consult performed by: Norma Case ordered by: Malcom Patel          General:  Increased diarrhea increased weakness  Cardiovascular:  No chest pressure or shortness of Breath  Respiratory:  No cough no hemoptysis no epistaxis  Gastrointestinal:  Positive diarrhea no nausea  Genitourinary:  No urgency frequency hematuria  Outside of the above review of systems, all others are essentially negative    Historical Information   Past Medical History:   Diagnosis Date    Cancer ([facility] )     Colon cancer ([facility] )     Hyperlipidemia     Hypertension      Past Surgical History:   Procedure Laterality Date    ABDOMINAL AORTIC ANEURYSM REPAIR, OPEN      ABDOMINAL HERNIA REPAIR       Social History   History   Alcohol Use No     History   Drug Use No     History   Smoking Status    Former Smoker   Smokeless Tobacco    Never Used     History reviewed  No pertinent family history      Meds/Allergies   all current active meds have been reviewed, current meds:   Current Facility-Administered Medications   Medication Dose Route Frequency    acetaminophen (TYLENOL) tablet 650 mg  650 mg Oral Once    acetaminophen (TYLENOL) tablet 650 mg  650 mg Oral Q6H PRN    diphenoxylate-atropine (LOMOTIL) 2 5-0 025 mg per tablet 1 tablet  1 tablet Oral 4x Daily    fluticasone-vilanterol (BREO ELLIPTA) 100-25 mcg/inh inhaler 1 puff  1 puff Inhalation Daily    metoprolol succinate (TOPROL-XL) 24 hr tablet 25 mg  25 mg Oral Daily    morphine injection 2 mg  2 mg Intravenous Q3H PRN    ondansetron (ZOFRAN) 8 mg in sodium chloride 0 9 % 50 mL IVPB  8 mg Intravenous Q6H PRN    pantoprazole (PROTONIX) EC tablet 40 mg  40 mg Oral Early Morning    sodium chloride infusion 0 45 %  100 mL/hr Intravenous Continuous    and PTA meds:    Prescriptions Prior to Admission   Medication    atorvastatin (LIPITOR) 20 mg tablet    esomeprazole (NexIUM) 40 MG capsule    fluticasone-vilanterol (BREO ELLIPTA) 100-25 mcg/inh inhaler    losartan (COZAAR) 100 MG tablet    metoprolol succinate (TOPROL-XL) 25 mg 24 hr tablet    ondansetron (ZOFRAN) 4 mg tablet       Allergies   Allergen Reactions    Elavil [Amitriptyline]        Objective   No intake or output data in the 24 hours ending 08/01/18 1045    Invasive Devices:        General: patient in NAD  Skin:  No new rash  Eyes:  No scleral icterus  ENT:  Dry mucous meds  Neck:  Supple no adenopathy  Chest:  Lungs are clear  CVS:  S1-S2  Abdomen:  Positive bowel sounds mild abdominal distension   Extremities:  No significant edema  Neuro:  Nonfocal  Psych:  Patient answers questions appropriately    Current Weight: Weight - Scale: 79 2 kg (174 lb 9 7 oz)  First Weight: Weight - Scale: 81 6 kg (180 lb)    Lab Results:  I have personally reviewed pertinent labs    CBC:   Lab Results   Component Value Date    WBC 1 72 (LL) 08/01/2018    WBC 7 47 07/10/2015    RBC 4 02 08/01/2018    RBC 5 43 07/10/2015     CMP:   Lab Results   Component Value Date     (H) 08/01/2018     09/04/2015     (H) 08/01/2018     09/04/2015    CO2 14 (L) 08/01/2018    CO2 25 4 09/04/2015    ANIONGAP 16 (H) 08/01/2018    ANIONGAP 11 09/04/2015    BUN 40 (H) 08/01/2018    BUN 24 09/04/2015    CREATININE 2 35 (H) 08/01/2018    CREATININE 1 46 (H) 09/04/2015    GLUCOSE 115 08/01/2018    GLUCOSE 95 09/04/2015    CALCIUM 8 5 08/01/2018    CALCIUM 8 4 09/04/2015    AST 34 07/28/2018    AST 23 09/04/2015    ALT 37 07/28/2018    ALT 35 09/04/2015    ALKPHOS 187 (H) 07/28/2018    ALKPHOS 89 09/04/2015    PROT 6 4 07/28/2018    PROT 7 2 09/04/2015    BILITOT 0 70 07/28/2018    BILITOT 0 39 09/04/2015    EGFR 26 08/01/2018     Phosphorus:   Lab Results   Component Value Date    PHOS 3 7 07/28/2018    PHOS 3 5 01/05/2015     Magnesium:   Lab Results   Component Value Date    MG 1 9 07/31/2018    MG 2 0 07/10/2015     Urinalysis:   Lab Results   Component Value Date    COLORU Light Yellow 07/28/2018    CLARITYU Slightly Cloudy 07/28/2018    SPECGRAV 1 020 07/28/2018    PHUR 5 5 07/28/2018    LEUKOCYTESUR Negative 07/28/2018    NITRITE Negative 07/28/2018    PROTEINUA 30 (1+) (A) 07/28/2018    GLUCOSEU Negative 07/28/2018    KETONESU Negative 07/28/2018    BILIRUBINUR Negative 07/28/2018    BLOODU Negative 07/28/2018     BMP:   Lab Results   Component Value Date    GLUCOSE 115 08/01/2018    GLUCOSE 95 09/04/2015    CO2 14 (L) 08/01/2018    CO2 25 4 09/04/2015    BUN 40 (H) 08/01/2018    BUN 24 09/04/2015    CREATININE 2 35 (H) 08/01/2018    CREATININE 1 46 (H) 09/04/2015    CALCIUM 8 5 08/01/2018    CALCIUM 8 4 09/04/2015

## 2018-08-01 NOTE — PROGRESS NOTES
Hematology - Oncology Progress Note    Emilee Lobato, 1945, [id number]  [address]      Impression and plan:    49-year-old male with recent diagnosis of metastatic M1 colon cancer (liver) presently on FOLFOX chemotherapy  Issues:     1  Neutropenic fevers  Fevers have abated, patient feels much better  Antibiotics have been discontinued  Patient received 2 days of G-CSF, white count improved  The white count has dropped back down but patient is not symptomatic (it was Mr Amarjit Salgado request a hold the G-CSF after the 1st 2 doses)  The plan is to continue to monitor for the time being      2  GI issues - better previously, patient having some diarrhea  Patient's abdominal pain is much less/better than before but patient has had some issues diarrhea  IV fluids is continue  Workup is ongoing  As discussed previously, C diff titers were negative      3  Colon cancer with liver metastases  Chemotherapy is obviously on hold until patient is better and cleared of his infection  I was able to speak to the patient's primary oncologist, Dr Jesus Robbins today  We discussed the case at length  We agreed that the patient is having a protracted/significant toxicities from the infusional 5 FU  Treatment regimen will be manipulated in the future  I will hold off on DPD testing for the time being      4  Question of Avastin  Patient did not get Avastin  5  Elevated BUN and creatinine   Patient is on IV fluids  Trend is being monitored  The above was discussed with patient and family; all questions were answered  ____________________________________________________________________________________________________    Chief complaint:  Abdominal pain, decreased appetite, nausea - now better    History of present illness: 49-year-old male admitted with the above  Mr Amarjit Salgado was recently diagnosed with metastatic (liver) cecal/colon adenocarcinoma and recently started chemotherapy    The 1st cycle was actually broken up into different pieces; patient only recently received his 1st complete regular cycle of FOLFOX  Patient began to have nausea, severe diarrhea and abdominal pain prompting an ER visit  CT of the abdomen and pelvis demonstrated "abnormal thickening and inflammation of the wall of the distal small bowel which could be infectious or inflammatory enteritis or ischemic bowel"  Patient has been admitted for pain control, rehydration and workup and treatment of the diarrhea      Presently patient states feeling +/-  Patient had diarrhea early today  Appetite is +/-, no nausea  Activities are about the same as before  Hospital medications list:    Current Facility-Administered Medications:  acetaminophen 650 mg Oral Once Olubusola O Oyesanmi, DO    acetaminophen 650 mg Oral Q6H PRN Emily Espitia MD    diphenoxylate-atropine 1 tablet Oral 4x Daily Sania Revankar, DO    fluticasone-vilanterol 1 puff Inhalation Daily Emily Espitia MD    metoprolol succinate 25 mg Oral Daily Emily Espitia MD    morphine injection 2 mg Intravenous Q3H PRN Emily Espitia MD    ondansetron 8 mg Intravenous Q6H PRN Emily Espitia MD Last Rate: 8 mg (08/01/18 1330)   pantoprazole 40 mg Oral Early Morning Emily Espitia MD    sodium bicarbonate infusion 100 mL/hr Intravenous Continuous Celestino Gama DO Last Rate: 100 mL/hr (08/01/18 1134)     Physical exam  /88 (BP Location: Left arm)   Pulse (!) 108   Temp 97 9 °F (36 6 °C) (Axillary)   Resp 18   Ht 5' 10" (1 778 m)   Wt 79 2 kg (174 lb 9 7 oz)   SpO2 98%   BMI 25 05 kg/m²   Constitutional: Well formed, well-nourished  in no apparent distress  Well-nourished male, no acute distress   HENT:   Head: Normocephalic and atraumatic  Right Ear: External ear normal    Left Ear: External ear normal    Nose: Nose normal    Mouth/Throat: Oropharynx is clear and moist    Eyes: Conjunctivae and EOM are normal  Pupils are equal, round, and reactive to light     Neck: Normal range of motion  Neck supple  Cardiovascular: Normal rate, regular rhythm, normal heart sounds and intact distal pulses  Pulmonary/Chest: Effort normal and breath sounds normal    Abdominal: Soft  Bowel sounds are normal    +bowel sounds, nontender, no rigidity or rebound  Musculoskeletal: Normal range of motion  Neurological: He is alert and oriented to person, place, and time  He has normal reflexes  Skin: Skin is warm  Right anterior chest wall port placed recently with he well healing suture line   Psychiatric: He has a normal mood and affect  His behavior is normal  Judgment and thought content normal    Extremities:  No edema, no cords, pulses are 1+  Lymphatics:  No adenopathy in the neck, supraclavicular region, axilla and groin bilaterally    Laboratory test results    Results for Poornima Idania (MRN [id number]) as of 7/30/2018 17:11   Ref  Range 7/29/2018 06:31   C DIFF TOXIN B Latest Ref Range: NEGATIVE for C difficle toxin by PCR  NEGATIVE for C di    Results for Poornima Idania (MRN [id number]) as of 8/1/2018 17:33   Ref  Range 8/1/2018 06:20   WBC Latest Ref Range: 4 31 - 10 16 Thousand/uL 1 72 (LL)   RBC Latest Ref Range: 3 88 - 5 62 Million/uL 4 02   Hemoglobin Latest Ref Range: 12 0 - 17 0 g/dL 10 5 (L)   Hematocrit Latest Ref Range: 36 5 - 49 3 % 34 8 (L)   MCV Latest Ref Range: 82 - 98 fL 87   MCH Latest Ref Range: 26 8 - 34 3 pg 26 1 (L)   MCHC Latest Ref Range: 31 4 - 37 4 g/dL 30 2 (L)   RDW Latest Ref Range: 11 6 - 15 1 % 17 5 (H)   Platelets Latest Ref Range: 149 - 390 Thousands/uL 167       Imaging     07/28/2018 CT scan of the abdomen pelvis     Abnormal thickening and inflammation of the wall of the distal small bowel which could be infectious or inflammatory enteritis or ischemic bowel   Appropriate lab work up suggested  There is some equalization of small bowel contents proximal to this which might indicate dysmotility or a partial minimal chronic small bowel obstruction   (Bowel not abnormally dilated at this time, however)  Large volume of metastatic tumor burden in the liver  Fluid in the right side of the colon suggesting diarrhea   Colonic diverticulosis also noted  Interval repair of abdominal aortic aneurysm without obvious complications    Horseshoe kidney   Small right inguinal hernia containing fat and fluid     Pathology     No pathology results are available for review

## 2018-08-01 NOTE — DISCHARGE SUMMARY
Discharge Summary - [facility]    Patient Information: Umesh Coe 74 y o  male MRN: [id number]  Unit/Bed#: [address] Encounter: [id number]    Discharging Physician / Practitioner: Cara Ramirez DO  PCP: Vinay Angela MD  Admission Date: 7/28/2018  Discharge Date: 8/1/18    Reason for Admission: Abdominal Pain (Patient has been having abdominal pain with vomiting and diarrhea since last chemo which was on Monday)      Discharge Diagnoses:     Principal Problem:    Neutropenic fever ([facility] )  Active Problems:    Acute kidney injury superimposed on chronic kidney disease ([facility] )    Diarrhea in adult patient    Metastatic colon cancer to liver ([facility] )    Nausea & vomiting    Hypernatremia    High anion gap metabolic acidosis    Essential hypertension    Other hyperlipidemia    GERD (gastroesophageal reflux disease)  Resolved Problems:    Abdominal pain    Hypomagnesemia        * Neutropenic fever ([facility] )   Assessment & Plan    Recent chemotherapy with FOLFOX on 7/23/2018 at [facility] which was q2w cycle#2, his first time full-dose chemotherapy via a right upper chest infusaport  CXR neg, UA not convincing for UTI  Blood cultures were negative as well  Fever resolved  ED had talked with covering Oncologist Dr Enrico Mathur contact # [phone] who recommended Zosyn until [address] >1500  Zosyn was discontinued once all cultures were negative and ANC greater than 1500   Patient was seen by Oncology  White cell count still low today  Status post Granix x 3 doses total while here        Diarrhea in adult patient   Assessment & Plan    Likely chemo related  Diarrhea did improve while here although not fully resolved  C  diff negative  He was initially started on Imodium p r n  and then changed to Lomotil  Patient advised to take schedule Lomotil till diarrhea continues to improve or resolve and then he can take it on a p r n   Basis  Patient's primary oncologist made aware of this        Acute kidney injury superimposed on chronic kidney disease [facility])   Assessment & Plan    Patient with CKD stage 3   More likely pre-renal due to from diarrhea and poor oral intake  Creatinine did improve with IVF hydration  Based on lab work provided by patient's family and by speaking with patient's primary oncologist his baseline creatinine is around 1 9-2 01  Creatinine higher than baseline on lab work today  Patient states that he will get repeat lab work after discharge with his primary oncologist        High anion gap metabolic acidosis   Assessment & Plan    Likely due to diarrhea  fluids were changed to include bicarbonate today  Patient stated that he would get repeat lab work after discharge with his primary oncologist        Hypernatremia   Assessment & Plan    IV fluids changed to dilute hypertonic bicarbonate solution as per Renal today  Patient will get repeat lab work with his primary oncologist after discharge        Nausea & vomiting   Assessment & Plan    Possibly Chemo related  Zofran p r n  for symptomatic treatment        Metastatic colon cancer to liver [facility])   Assessment & Plan    Pt's primary Oncologist Dr Corry Bazzi contact # [phone]  Last chemo on 7/23/2018 with Fluorouracil oxaliplatin leucovorin, cycle# 2, first time full dose chemo as per pt and family, via RUL infusaport  Updated primary oncologist earlier today         Abdominal painresolved as of 8/2/2018   Assessment & Plan    Now resolved  CT abd/pelvis reviewed: Abnormal thickening and inflammation of the wall of the distal small bowel which could be infectious or inflammatory enteritis or ischemic bowel  Possible dysmotility or a partial minimal chronic small bowel obstruction  Large volume of metastatic tumor burden in the liver  diarrhea noted  Interval repair of abdominal aortic aneurysm without obvious complications  Horseshoe kidney  Small right inguinal hernia containing fat and fluid    Doubt ischemic bowel given hx & clinical presentation, lactic acid normal         GERD (gastroesophageal reflux disease)   Assessment & Plan    Continue Nexium as he uses at home        Other hyperlipidemia   Assessment & Plan    Continue statin        Essential hypertension   Assessment & Plan    As creatinine is not quite back at baseline, patient advised to continue to hold losartan for now and restart on Aug 5 2018  Discussed with Nephrology  Continue Toprol-XL        Hypomagnesemiaresolved as of 8/2/2018   Assessment & Plan    Resolved with repletion            Consultations During Hospital Stay:  IP CONSULT TO ONCOLOGY  IP CONSULT TO CASE MANAGEMENT  IP CONSULT TO NUTRITION SERVICES  IP CONSULT TO NEPHROLOGY    Procedures Performed:     · None    Significant Findings:     · See hospital course and above    Imaging while in hospital:    Ct Abdomen Pelvis Wo Contrast    Result Date: 7/28/2018  Narrative: CT ABDOMEN AND PELVIS WITHOUT IV CONTRAST INDICATION:   abd pain  Vomiting and diarrhea since last chemotherapy session which was on Monday  History of colon cancer  COMPARISON:  6/9/2014 TECHNIQUE:  CT examination of the abdomen and pelvis was performed without intravenous contrast   Axial, sagittal, and coronal 2D reformatted images were created from the source data and submitted for interpretation  Radiation dose length product (DLP) for this visit:  615 68 mGy-cm   This examination, like all CT scans performed in the [facility], was performed utilizing techniques to minimize radiation dose exposure, including the use of iterative  reconstruction and automated exposure control  Enteric contrast was not administered  FINDINGS: ABDOMEN LOWER CHEST:  Minimal dependent atelectasis is noted in the lung bases  There are some coronary artery calcification  LIVER/BILIARY TREE:  There is extensive hepatic metastatic disease throughout the liver with numerous large masses seen in the left and right hepatic lobes    These are new since the previous CT exam   The dominant left lobe mass measures 11 3 cm diameter  Masses in the posterior aspect of the right liver measure up to about 10 cm and a more inferiorly located dominant mass in the lower portion of the right hepatic lobe measures 11 4 cm  No gross evidence of biliary obstruction  GALLBLADDER:  Gallbladder is somewhat distended but does not appear inflamed  There might be some sludge in the lumen  It appears to have some layering density dependently  SPLEEN:  Unremarkable  PANCREAS:  Unremarkable  ADRENAL GLANDS:  Unremarkable  KIDNEYS/URETERS:  There is a horseshoe kidney with a relatively thin isthmic portion  There is no hydronephrosis or kidney stone identified  STOMACH AND BOWEL:  The patient's distal small bowel is thick-walled and inflamed appearing  This could indicate the presence of infectious or inflammatory enteritis or ischemic bowel  Correlate with appropriate lab work up  There does not appear to be any pneumatosis in the bowel wall  There is some equalization of enteric contents within the midportion of the small bowel which might indicate that there is some degree of bowel dysmotility or perhaps some minimal partial chronic small bowel obstruction although the small bowel loops do not seem overtly dilated at this time  There is fluid in the right side of the colon  There is diverticulosis along the left side of the colon  The stomach is unremarkable  APPENDIX:  A normal appendix was visualized  ABDOMINOPELVIC CAVITY:  There is no free air  There may be a wisp of pelvic free fluid  A couple small lymph nodes are visible in the right lower quadrant mesentery  No bulky adenopathy seen  VESSELS:  Patient is status post repair of infrarenal abdominal aortic aneurysm  No acute vascular pathology appreciated on this noncontrast exam  PELVIS REPRODUCTIVE ORGANS:  Unremarkable for patient's age  URINARY BLADDER:  Unremarkable   ABDOMINAL WALL/INGUINAL REGIONS:  There is a right inguinal hernia which contains fat and a small amount of fluid  OSSEOUS STRUCTURES:  There are couple small sclerotic densities in the right iliac bone which may be bone islands  There are no overtly destructive metastatic bone lesion seen  No fractures  Impression: Abnormal thickening and inflammation of the wall of the distal small bowel which could be infectious or inflammatory enteritis or ischemic bowel  Appropriate lab work up suggested  There is some equalization of small bowel contents proximal to this which might indicate dysmotility or a partial minimal chronic small bowel obstruction  (Bowel not abnormally dilated at this time, however) Large volume of metastatic tumor burden in the liver  Fluid in the right side of the colon suggesting diarrhea  Colonic diverticulosis also noted  Interval repair of abdominal aortic aneurysm without obvious complications  Horseshoe kidney Small right inguinal hernia containing fat and fluid Workstation performed: [id number]     Xr Chest 1 View Portable    Result Date: 7/28/2018  Narrative: CHEST INDICATION:   fever  Abdominal pain  Metastatic colon cancer  Patient on chemotherapy  COMPARISON:  6/22/2014 EXAM PERFORMED/VIEWS:  XR CHEST PORTABLE FINDINGS:  There is a right-sided infusion port catheter device present  Cardiomediastinal silhouette appears unremarkable  The lungs are clear  No pneumothorax or pleural effusion  Osseous structures appear within normal limits for patient age  Impression: No acute cardiopulmonary disease  Workstation performed: [id number]     Ct Head Wo Contrast    Result Date: 7/31/2018  Narrative: CT BRAIN - WITHOUT CONTRAST INDICATION:   Decreased alertness  COMPARISON:  None  TECHNIQUE:  CT examination of the brain was performed  In addition to axial images, coronal 2D reformatted images were created and submitted for interpretation  Radiation dose length product (DLP) for this visit:  1220 52 mGy-cm     This examination, like all CT scans performed in the [facility], was performed utilizing techniques to minimize radiation dose exposure, including the use of iterative reconstruction and automated exposure control  IMAGE QUALITY:  Diagnostic  FINDINGS: PARENCHYMA:  No intracranial mass, mass effect or midline shift  No CT signs of acute infarction  No acute parenchymal hemorrhage  There is mild periventricular white matter low attenuation which is nonspecific and most likely related to chronic small vessel ischemic changes  VENTRICLES AND EXTRA-AXIAL SPACES:  There is prominence of the ventricles and sulci related to mild volume loss  VISUALIZED ORBITS AND PARANASAL SINUSES:  Unremarkable  CALVARIUM AND EXTRACRANIAL SOFT TISSUES:  Normal      Impression: No acute intracranial abnormality  Workstation performed: [id number]       Incidental Findings:   · Horseshoe kidney   · Small right inguinal hernia     Test Results Pending at Discharge (will require follow up):   · As per After Visit Summary     Outpatient Tests Requested:  · None    Complications:  See hospital course and above    Hospital Course:     Tracy Garza is a 74 y o  male patient who originally presented to the hospital on 7/28/2018 due to her abdominal pain, nausea, vomiting, multiple episodes of watery diarrhea after recent chemotherapy with FOLFOX on Jul 23 2018  This was his 2nd cycle  In the ER his temp was noted to be 100 2  He was noted to be tachycardic  The ER physician spoke to covering oncologist who recommended starting patient on Zosyn until [address] greater than 1500  Patient was admitted and started on IV Zosyn which was later discontinued after his ANC was greater than 1500 and cultures were negative  C diff was negative  He was initially started on Imodium p r n  and then changed to Lomotil after which his diarrhea improved although not fully resolved  He was seen by Oncology and Nephrology while here     Patient's lab work today showed low bicarb level and high anion gap metabolic acidosis likely from diarrhea  IV fluids were changed to include bicarbonate as per Renal however patient did not wish to stay overnight  Patient is aware of the hypernatremia and anion gap metabolic acidosis and stated that he would get lab work done with his own oncologist   Patient agreed to stay most of the day to get IV fluids  Discharge plan was discussed with not only the patient but also his son and later his daughter-in-law who was present at bedside  I also spoke with the patient's oncologist Dr Sisi Burciaga and gave him an update as per patient and family request   Discussed with him that patient still has poor p o  intake and since patient has an appointment with him on Monday, he may discuss the possibility of starting an appetite stimulant if appropriate  patient advised to keep himself well hydrated at home  As per his primary oncologist, patient has been set up to receive IV hydration this week    Please see above list of diagnoses and related plan for additional information  Condition at Discharge: stable     Discharge Day Visit / Exam:     Subjective:  Reports diarrhea but much improved from before  States that he is now having small amounts of diarrhea  Still with poor p o  intake  Feels better compared to when he 1st came in and wants to go home    Vitals: Blood Pressure: 110/73 (08/01/18 2009)  Pulse: (!) 122 (08/01/18 2009)  Temperature: 97 8 °F (36 6 °C) (08/01/18 2009)  Temp Source: Oral (08/01/18 2009)  Respirations: 18 (08/01/18 2009)  Height: 5' 10" (177 8 cm) (07/28/18 0850)  Weight - Scale: 79 2 kg (174 lb 9 7 oz) (08/01/18 0600)  SpO2: 97 % (08/01/18 2009)  Exam:   Physical Exam   Constitutional: He is oriented to person, place, and time  He appears well-developed and well-nourished  No distress  HENT:   Head: Normocephalic and atraumatic  Eyes: EOM are normal  Right eye exhibits no discharge  Left eye exhibits no discharge  No scleral icterus  Neck: Neck supple  Cardiovascular: Normal rate, regular rhythm and normal heart sounds  Pulmonary/Chest: Effort normal and breath sounds normal  No respiratory distress  He has no wheezes  He has no rales  Abdominal: Soft  Bowel sounds are normal  He exhibits no distension  There is no tenderness  Musculoskeletal: He exhibits no edema  Neurological: He is alert and oriented to person, place, and time  No cranial nerve deficit  Skin: He is not diaphoretic  Psychiatric: He has a normal mood and affect  His behavior is normal        Discharge instructions/Information to patient and family:(Discharge Medications and Follow up):   See after visit summary for information provided to patient and family  Provisions for Follow-Up Care:  See after visit summary for information related to follow-up care and any pertinent home health orders  Disposition: Home    Planned Readmission:  No     Discharge Statement:  I spent > 30 minutes discharging the patient  This time was spent on the day of discharge  I had direct contact with the patient on the day of discharge  Greater than 50% of the total time was spent examining patient, answering all patient questions, arranging and discussing plan of care with patient as well as directly providing post-discharge instructions  Additional time then spent on discharge activities  Discharge Medications:  See after visit summary for reconciled discharge medications provided to patient and family  ** Please Note:  Dictation voice to text software may have been used in the creation of this document   **

## 2018-08-02 PROBLEM — R10.9 ABDOMINAL PAIN: Status: RESOLVED | Noted: 2018-07-28 | Resolved: 2018-08-02

## 2018-08-02 PROBLEM — E83.42 HYPOMAGNESEMIA: Status: RESOLVED | Noted: 2018-07-28 | Resolved: 2018-08-02

## 2018-08-02 LAB
BACTERIA BLD CULT: NORMAL
BACTERIA BLD CULT: NORMAL

## 2018-09-21 ENCOUNTER — APPOINTMENT (EMERGENCY)
Dept: RADIOLOGY | Facility: HOSPITAL | Age: 73
End: 2018-09-21
Payer: MEDICARE

## 2018-09-21 ENCOUNTER — HOSPITAL ENCOUNTER (EMERGENCY)
Facility: HOSPITAL | Age: 73
Discharge: HOME | End: 2018-09-21
Attending: EMERGENCY MEDICINE | Admitting: EMERGENCY MEDICINE
Payer: MEDICARE

## 2018-09-21 VITALS
RESPIRATION RATE: 20 BRPM | WEIGHT: 160 LBS | TEMPERATURE: 99.1 F | DIASTOLIC BLOOD PRESSURE: 80 MMHG | OXYGEN SATURATION: 98 % | HEART RATE: 108 BPM | BODY MASS INDEX: 22.96 KG/M2 | SYSTOLIC BLOOD PRESSURE: 115 MMHG

## 2018-09-21 DIAGNOSIS — S09.90XA INJURY OF HEAD, INITIAL ENCOUNTER: ICD-10-CM
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
## 2018-09-21 DIAGNOSIS — S39.012A BACK STRAIN, INITIAL ENCOUNTER: ICD-10-CM

## 2018-09-21 DIAGNOSIS — S01.81XA FACIAL LACERATION, INITIAL ENCOUNTER: Primary | ICD-10-CM

## 2018-09-21 PROCEDURE — 99284 EMERGENCY DEPT VISIT MOD MDM: CPT

## 2018-09-21 PROCEDURE — 70450 CT HEAD/BRAIN W/O DYE: CPT

## 2018-09-21 PROCEDURE — 72131 CT LUMBAR SPINE W/O DYE: CPT

## 2018-09-21 RX ORDER — OXYCODONE HYDROCHLORIDE AND ACETAMINOPHEN 5; 325 MG/1; MG/1
1 TABLET ORAL EVERY 6 HOURS PRN
Qty: 10 TABLET | Refills: 0 | Status: SHIPPED | OUTPATIENT
Start: 2018-09-21 | End: 2018-10-01

## 2018-09-21 RX ORDER — LIDOCAINE 50 MG/G
1 PATCH TOPICAL DAILY
Qty: 30 PATCH | Refills: 0 | Status: SHIPPED | OUTPATIENT
Start: 2018-09-21

## 2018-09-21 RX ORDER — GINSENG 100 MG
1 CAPSULE ORAL ONCE
Status: COMPLETED | OUTPATIENT
Start: 2018-09-21 | End: 2018-09-21

## 2018-09-21 RX ORDER — OXYCODONE HYDROCHLORIDE AND ACETAMINOPHEN 5; 325 MG/1; MG/1
2 TABLET ORAL ONCE
Status: COMPLETED | OUTPATIENT
Start: 2018-09-21 | End: 2018-09-21

## 2018-09-21 RX ADMIN — OXYCODONE HYDROCHLORIDE AND ACETAMINOPHEN 2 TABLET: 5; 325 TABLET ORAL at 22:09

## 2018-09-21 RX ADMIN — BACITRACIN ZINC 1 SMALL APPLICATION: 500 OINTMENT TOPICAL at 22:09

## 2018-09-21 NOTE — ED PROVIDER NOTES
History  Chief Complaint   Patient presents with   Vena Paresh Fall     States he tripped over the top and face struck arm of couch and he states his eye glasses cut his left brow  3 cm laceration above left brow  Denies LOC  Patient on chemo for colo-rectal/liver cancer  Second 3 cm laceration noted next to first, not as deep    Back Pain     States he twisted his lower back when he fell      72 yowm was walking and tripped over the dog at home, struck face on side of couch and thinks his eyeglasses cut his face  Then he twisted his lower back as he continued to go down and c/o lower back pain, midline  No LOC  No dizziness  No neck pain  History provided by:  Patient   used: No        Prior to Admission Medications   Prescriptions Last Dose Informant Patient Reported? Taking? UNKNOWN TO PATIENT Past Week at Unknown time Family Member Yes Yes   Sig: Patient on chemotherapy, takes a diuretic, takes a cough suppressant syrup, and expectorant  atorvastatin (LIPITOR) 20 mg tablet 9/21/2018 at Unknown time  Yes Yes   Sig: Take by mouth   esomeprazole (NexIUM) 40 MG capsule 9/21/2018 at Unknown time  Yes Yes   Sig: Take 40 mg by mouth every morning before breakfast   fluticasone-vilanterol (BREO ELLIPTA) 100-25 mcg/inh inhaler 9/21/2018 at Unknown time  Yes Yes   Sig: Inhale 1 puff daily Rinse mouth after use     losartan (COZAAR) 100 MG tablet 9/21/2018 at Unknown time  No Yes   Sig: Take 1 tablet (100 mg total) by mouth daily   metoprolol succinate (TOPROL-XL) 25 mg 24 hr tablet 9/21/2018 at Unknown time  Yes Yes   Sig: Take 25 mg by mouth daily   ondansetron (ZOFRAN-ODT) 8 mg disintegrating tablet Past Month at Unknown time  No Yes   Sig: Take 1 tablet (8 mg total) by mouth every 8 (eight) hours as needed for nausea or vomiting      Facility-Administered Medications: None       Past Medical History:   Diagnosis Date    Cancer [facility])     liver metastasis    [facility] ([facility] [address] )     Hyperlipidemia     Hypertension     Past heart attack     Renal disorder        Past Surgical History:   Procedure Laterality Date    ABDOMINAL AORTIC ANEURYSM REPAIR, OPEN      ABDOMINAL HERNIA REPAIR      AORTA - ILIAC ARTERY BYPASS GRAFT Right 06/20/2014    Right renal artery reimplantation     CATARACT EXTRACTION, BILATERAL  2013    KNEE SURGERY Right 1964    PORTACATH PLACEMENT         Family History   Problem Relation Age of Onset    Strabismus Mother     Stroke Mother     Hypertension Father      I have reviewed and agree with the history as documented  Social History   Substance Use Topics    Smoking status: Former Smoker    Smokeless tobacco: Never Used    Alcohol use No      Comment: Alcohol Use: 1 beer/wine/liquor(what ever us in  house) per day          Review of Systems   Constitutional: Negative  Negative for chills and fever  HENT: Negative  Negative for congestion and sore throat  Eyes: Negative  Respiratory: Negative  Negative for cough and shortness of breath  Cardiovascular: Negative  Negative for chest pain and leg swelling  Gastrointestinal: Negative  Negative for abdominal pain, diarrhea, nausea and vomiting  Genitourinary: Negative  Negative for dysuria, flank pain and hematuria  Musculoskeletal: Positive for back pain  Negative for myalgias  Skin: Negative  Negative for rash and wound  Neurological: Negative  Negative for dizziness and headaches  Psychiatric/Behavioral: Negative  Negative for confusion and hallucinations  The patient is not nervous/anxious  All other systems reviewed and are negative  Physical Exam  Physical Exam   Constitutional: He is oriented to person, place, and time  He appears well-developed and well-nourished  No distress  HENT:   Head: Normocephalic    + lacs left forehead/temple x 2, one is 4 cm and the other is 3 cm  No FB  Bleeding controlled       Eyes: Conjunctivae and EOM are normal  Pupils are equal, round, and reactive to light  No scleral icterus  Neck: Normal range of motion  Neck supple  Not tender over midline   Cardiovascular: Normal rate, regular rhythm and normal heart sounds  No murmur heard  Pulmonary/Chest: Effort normal and breath sounds normal  No respiratory distress  He exhibits no tenderness  Abdominal: Soft  Bowel sounds are normal  He exhibits no distension  There is no tenderness  Musculoskeletal: Normal range of motion  He exhibits no edema, tenderness or deformity  Back + ttp mid-lower lumbar midline   Neurological: He is alert and oriented to person, place, and time  No cranial nerve deficit  He exhibits normal muscle tone  Speech intact; no droop, no drift  Skin: Skin is warm and dry  No rash noted  He is not diaphoretic  No erythema  No pallor  Psychiatric: He has a normal mood and affect  His behavior is normal    Nursing note and vitals reviewed        Vital Signs  ED Triage Vitals   Temperature Pulse Respirations Blood Pressure SpO2   09/21/18 1936 09/21/18 1940 09/21/18 1940 09/21/18 1940 09/21/18 1940   99 1 °F (37 3 °C) (!) 108 20 115/80 98 %      Temp Source Heart Rate Source Patient Position - Orthostatic VS BP Location FiO2 (%)   09/21/18 1936 09/21/18 1940 09/21/18 1940 09/21/18 1940 --   Tympanic Monitor Lying Left arm       Pain Score       09/21/18 1940       7           Vitals:    09/21/18 1940   BP: 115/80   Pulse: (!) 108   Patient Position - Orthostatic VS: Lying       Visual Acuity  Visual Acuity      Most Recent Value   L Pupil Size (mm)  2   R Pupil Size (mm)  2          ED Medications  Medications   bacitracin topical ointment 1 small application (1 small application Topical Given 9/21/18 2209)   oxyCODONE-acetaminophen (PERCOCET) 5-325 mg per tablet 2 tablet (2 tablets Oral Given 9/21/18 2209)       Diagnostic Studies  Results Reviewed     None                 CT lumbar spine without contrast   Final Result by Maury Verdugo MD (09/21 2052) No acute osseous abnormality  Workstation performed: [id number]         CT head without contrast   Final Result by Alycai Burns MD (09/21 2044)      No acute intracranial abnormality  Workstation performed: [id number]                    Procedures  Lac Repair  Date/Time: 9/21/2018 10:00 PM  Performed by: Ruth Waters by: Osman Massey   Consent: Verbal consent obtained  Consent given by: patient  Patient identity confirmed: verbally with patient  Body area: head/neck  Location details: forehead  Laceration length: 7 cm  Tendon involvement: none  Nerve involvement: none  Vascular damage: no  Anesthesia: local infiltration    Anesthesia:  Local Anesthetic: lidocaine 1% with epinephrine    Sedation:  Patient sedated: no    Wound Dehiscence:  Superficial Wound Dehiscence: simple closure      Procedure Details:  Preparation: Patient was prepped and draped in the usual sterile fashion  Amount of cleaning: standard  Debridement: none  Skin closure: 5-0 nylon  Number of sutures: 14  Technique: simple  Approximation: close  Approximation difficulty: simple  Dressing: antibiotic ointment  Patient tolerance: Patient tolerated the procedure well with no immediate complications             Phone Contacts  ED Phone Contact    ED Course                               MDM  Number of Diagnoses or Management Options  Back strain, initial encounter:   Facial laceration, initial encounter:   Fall, initial encounter:   Injury of head, initial encounter:   Diagnosis management comments: Advised of wound care, rest at home, ice for swelling, follow up if any problems  Note pt  Requested lidoderm patch as well, he has used them in the past but left them at home  And pt  Remembered he got tetanus booster a few months ago      CritCare Time    Disposition  Final diagnoses:   Facial laceration, initial encounter   Fall, initial encounter   Injury of head, initial encounter   Back strain, initial encounter     Time reflects when diagnosis was documented in both MDM as applicable and the Disposition within this note     Time User Action Codes Description Comment    1/41/3952  2:58 PM Adenonae Nims A Add [P03 77CB] Facial laceration, initial encounter     9/37/3586  5:62 PM Jf Simms Add [N89  OOBK] Fall, initial encounter     1/03/6681  7:06 PM Adelene Nims A Add [W84 [id number]] Injury of head, initial encounter     1/07/3579  0:40 PM Mary Ann Duenas Add [A10 543O] Back strain, initial encounter       ED Disposition     ED Disposition Condition Comment    Discharge  [facility] discharge to home/self care  Condition at discharge: Stable        Follow-up Information     Follow up With Specialties Details Why Contact Info    Azar Rubalcava MD Family Medicine  As needed [facility]  [phone]            Patient's Medications   Discharge Prescriptions    LIDOCAINE (LIDODERM) 5 %    Apply 1 patch topically daily Remove & Discard patch within 12 hours or as directed by MD       Start Date: 9/21/2018 End Date: --       Order Dose: 1 patch       Quantity: 30 patch    Refills: 0    OXYCODONE-ACETAMINOPHEN (PERCOCET) 5-325 MG PER TABLET    Take 1 tablet by mouth every 6 (six) hours as needed for moderate pain for up to 10 days Max Daily Amount: 4 tablets       Start Date: 9/21/2018 End Date: 10/1/2018       Order Dose: 1 tablet       Quantity: 10 tablet    Refills: 0     No discharge procedures on file      ED Provider  Electronically Signed by           Michael Puga MD  37/15/41 5308       Michael Puga MD  97/89/34 4933

## 2018-09-22 NOTE — DISCHARGE INSTRUCTIONS
CT scans are ok  Rest at home, ice to swelling  Clean wound daily and apply triple antibiotic ointment 2-3 times a day  You can use the pain medicine as needed for severe pain or just tylenol for mild pain  Let your doctor know what happened  Sutures should be removed in 7 days  Head Injury   WHAT YOU NEED TO KNOW:   A head injury is most often caused by a blow to the head  This may occur from a fall, bicycle injury, sports injury, being struck in the head, or a motor vehicle accident  DISCHARGE INSTRUCTIONS:   Call 911 or have someone else call for any of the following:   · You cannot be woken  · You have a seizure  · You stop responding to others or you faint  · You have blurry or double vision  · Your speech becomes slurred or confused  · You have arm or leg weakness, loss of feeling, or new problems with coordination  · Your pupils are larger than usual or one pupil is a different size than the other  · You have blood or clear fluid coming out of your ears or nose  Return to the emergency department if:   · You have repeated or forceful vomiting  · You feel confused  · Your headache gets worse or becomes severe  · You or someone caring for you notices that you are harder to wake than usual   Contact your healthcare provider if:   · Your symptoms last longer than 6 weeks after the injury  · You have questions or concerns about your condition or care  Medicines:   · Acetaminophen  decreases pain  Acetaminophen is available without a doctor's order  Ask how much to take and how often to take it  Follow directions  Acetaminophen can cause liver damage if not taken correctly  · Take your medicine as directed  Contact your healthcare provider if you think your medicine is not helping or if you have side effects  Tell him or her if you are allergic to any medicine  Keep a list of the medicines, vitamins, and herbs you take   Include the amounts, and when and why you take them  Bring the list or the pill bottles to follow-up visits  Carry your medicine list with you in case of an emergency  Self-care:   · Rest  or do quiet activities for 24 to 48 hours  Limit your time watching TV, using the computer, or doing tasks that require a lot of thinking  Slowly return to your normal activities as directed  Do not play sports or do activities that may cause you to get hit in the head  Ask your healthcare provider when you can return to sports  · Apply ice  on your head for 15 to 20 minutes every hour or as directed  Use an ice pack, or put crushed ice in a plastic bag  Cover it with a towel before you apply it to your skin  Ice helps prevent tissue damage and decreases swelling and pain  · Have someone stay with you for 24 hours  or as directed  This person can monitor you for complications and call [phone]  When you are awake the person should ask you a few questions to see if you are thinking clearly  An example would be to ask your name or your address  Prevent another head injury:   · Wear a helmet that fits properly  Do this when you play sports, or ride a bike, scooter, or skateboard  Helmets help decrease your risk of a serious head injury  Talk to your healthcare provider about other ways you can protect yourself if you play sports  · Wear your seat belt every time you are in a car  This helps to decrease your risk for a head injury if you are in a car accident  Follow up with your healthcare provider as directed:  Write down your questions so you remember to ask them during your visits  © 2017 [address] Information is for End User's use only and may not be sold, redistributed or otherwise used for commercial purposes  All illustrations and images included in CareNotes® are the copyrighted property of [employer]  or Jenaro Felder  The above information is an  only   It is not intended as medical advice for individual conditions or treatments  Talk to your doctor, nurse or pharmacist before following any medical regimen to see if it is safe and effective for you  Low Back Strain, Ambulatory Care   GENERAL INFORMATION:   Low back strain  is an injury to your lower back muscles or tendons  Tendons are strong tissues that connect muscles to bones  The lower back supports most of your body weight and helps you move, twist, and bend  Low back strain is usually caused by activities that increase stress on the lower back, such as exercise or injury  Common symptoms include the following:   · Low back pain or muscle spasms    · Stiffness or limited movement    · Pain that goes down to the buttocks, groin, or legs    · Pain that is worse with activity  Seek immediate care for the following symptoms:   · A pop in your lower back    · Increased swelling or pain in your lower back    · Trouble moving your legs    · Numbness in your legs  Treatment for low back strain:   · NSAIDs  help decrease swelling and pain or fever  This medicine is available with or without a doctor's order  NSAIDs can cause stomach bleeding or kidney problems in certain people  If you take blood thinner medicine, always ask your healthcare provider if NSAIDs are safe for you  Always read the medicine label and follow directions  · Muscle relaxers  help decrease muscle spasms pain  · Prescription pain medicine  may be given  Ask how to take this medicine safely  Manage your symptoms:   · Rest  in bed after your injury  Slowly start to increase your activity as the pain decreases, or as directed  · Apply ice  on your lower back for 15 to 20 minutes every hour or as directed  Use an ice pack, or put crushed ice in a plastic bag  Cover it with a towel  Ice helps prevent tissue damage and decreases swelling and pain  You can alternate ice and heat  · Apply heat  on your lower back for 20 to 30 minutes every 2 hours for as many days as directed   Heat helps decrease pain and muscle spasms  Prevent another low back strain:   · Use proper body mechanics  ¨ Bend at the hips and knees when you  objects  Do not bend from the waist  Use your leg muscles as you lift the load  Do not use your back  Keep the object close to your chest as you lift it  Try not to twist or lift anything above your waist     ¨ Change your position often when you stand for long periods of time  Rest one foot on a small box or footrest, and then switch to the other foot often  ¨ Try not to sit for long periods of time  When you do, sit in a straight-backed chair with your feet flat on the floor  Never reach, pull, or push while you are sitting  · Exercise regularly  Warm up before you exercise  Do exercises that strengthen your back muscles  Ask about the best exercise plan for you  · Maintain a healthy weight  Ask your healthcare provider how much you should weigh  Ask him to help you create a weight loss plan if you are overweight  Follow up with your healthcare provider as directed:  Write down your questions so you remember to ask them during your visits  CARE AGREEMENT:   You have the right to help plan your care  Learn about your health condition and how it may be treated  Discuss treatment options with your caregivers to decide what care you want to receive  You always have the right to refuse treatment  The above information is an  only  It is not intended as medical advice for individual conditions or treatments  Talk to your doctor, nurse or pharmacist before following any medical regimen to see if it is safe and effective for you  © 2014 [address] is for End User's use only and may not be sold, redistributed or otherwise used for commercial purposes  All illustrations and images included in CareNotes® are the copyrighted property of [employer]  or [facility] Absorbable Stitches WHAT YOU NEED TO KNOW:   Absorbable stitches, or sutures, are used to close cuts or wounds  These stitches are absorbed by your body, or fall off on their own within days or weeks  They do not need to be removed  DISCHARGE INSTRUCTIONS:   Return to the emergency department if:   · Your wound comes apart  · You have red streaks around your wound  · You have swollen or painful lymph nodes  · Blood soaks through your bandage  Contact your healthcare provider if:   · You have signs of an infection, such as increased redness, pain, swelling, or pus  · You have a fever  · Your stitches do not absorb when your healthcare provider says they should  · You have questions or concerns about your condition or care  Care for your wound and absorbable stitches as directed:   · Protect your wound from further injury  Wear protective clothing over your wound, and protect it from sunlight  Do not place pressure on your wound  This could reopen your wound and increase your risk for an infection  · Elevate your wound  Keep your wound above the level of your heart as often as you can  This will help decrease swelling and pain  Prop your wounded area on pillows or blankets, if possible, to keep it elevated comfortably  · Wash and bandage your wound  Carefully wash the wound with soap and water  Gently dry the area and put on new, clean bandages as directed  Change your bandages when they get wet or dirty  · Take showers instead of baths  Wait 12 to 24 hours after you receive your stitches before you take a shower  Do not take a bath or swim  Follow up with your healthcare provider as directed:  Write down your questions so you remember to ask them during your visits  © 2017 [address] Information is for End User's use only and may not be sold, redistributed or otherwise used for commercial purposes   All illustrations and images included in CareNotes® are the copyrighted property of [employer]  or Jenaro Felder  The above information is an  only  It is not intended as medical advice for individual conditions or treatments  Talk to your doctor, nurse or pharmacist before following any medical regimen to see if it is safe and effective for you

## 2018-09-22 NOTE — ED NOTES
PT given 1 percocet and one to go per Dr William Chisholm, RN  09/21/18 [facility], RN  09/21/18 3719

## 2018-09-22 NOTE — ED NOTES
Pt is resting in bed  Family is at bedside  Reported pain 4/10        Luis Angel Garcia RN  09/21/18 6450

## 2018-09-22 NOTE — ED NOTES
Bacitracin applied with 4x4 covering site  Laceration well approximated with sutures  No c/o         Shen Ramires RN  09/21/18 7590

## 2019-02-12 NOTE — NURSING NOTE
Pt discharged via wheelchair accompanied by son and daughter in law  Discharge instructions and precriptions given  No further questions at this time  IV DC and intact  none

## 2019-11-14 NOTE — ASSESSMENT & PLAN NOTE
As creatinine is not quite back at baseline, patient advised to continue to hold losartan for now and restart on Aug 5 2018    Discussed with Nephrology  Continue Toprol-XL
Continue Nexium as he uses at home
Continue statin
IV fluids changed to dilute hypertonic bicarbonate solution as per Renal today  Patient will get repeat lab work with his primary oncologist after discharge
Likely chemo related  Diarrhea did improve while here although not fully resolved  C  diff negative  He was initially started on Imodium p r n  and then changed to Lomotil  Patient advised to take schedule Lomotil till diarrhea continues to improve or resolve and then he can take it on a p r n   Basis  Patient's primary oncologist made aware of this
Likely due to diarrhea   fluids were changed to include bicarbonate today  Patient stated that he would get repeat lab work after discharge with his primary oncologist
Now resolved  CT abd/pelvis reviewed: Abnormal thickening and inflammation of the wall of the distal small bowel which could be infectious or inflammatory enteritis or ischemic bowel  Possible dysmotility or a partial minimal chronic small bowel obstruction  Large volume of metastatic tumor burden in the liver  diarrhea noted  Interval repair of abdominal aortic aneurysm without obvious complications  Horseshoe kidney  Small right inguinal hernia containing fat and fluid    Doubt ischemic bowel given hx & clinical presentation, lactic acid normal 
Patient with CKD stage 3   More likely pre-renal due to from diarrhea and poor oral intake  Creatinine did improve with IVF hydration  Based on lab work provided by patient's family and by speaking with patient's primary oncologist his baseline creatinine is around 1 9-2 01  Creatinine higher than baseline on lab work today  Patient states that he will get repeat lab work after discharge with his primary oncologist
Possibly Chemo related  Zofran p r n  for symptomatic treatment
Pt's primary Oncologist Dr Idalmis Schuster contact # [phone]  Last chemo on 7/23/2018 with Fluorouracil oxaliplatin leucovorin, cycle# 2, first time full dose chemo as per pt and family, [facility]    Updated primary oncologist earlier today
Recent chemotherapy with FOLFOX on [facility] which was q2w cycle#2, his first time full-dose chemotherapy via a right upper chest infusaport  CXR neg, UA not convincing for UTI  Blood cultures were negative as well  Fever resolved  ED had talked with covering Oncologist Dr Swetha Smallwood contact # [phone] who recommended Zosyn until [address] >1500  Zosyn was discontinued once all cultures were negative and ANC greater than 1500   Patient was seen by Oncology  White cell count still low today    Status post Granix x 3 doses total while here
Resolved with repletion
Instructions (Optional): Continue SRT
Detail Level: Simple